# Patient Record
Sex: FEMALE | Race: WHITE | NOT HISPANIC OR LATINO | Employment: OTHER | URBAN - METROPOLITAN AREA
[De-identification: names, ages, dates, MRNs, and addresses within clinical notes are randomized per-mention and may not be internally consistent; named-entity substitution may affect disease eponyms.]

---

## 2018-01-30 ENCOUNTER — CONVERSION ENCOUNTER (OUTPATIENT)
Dept: GENERAL RADIOLOGY | Facility: HOSPITAL | Age: 73
End: 2018-01-30

## 2018-04-04 ENCOUNTER — OFFICE VISIT CONVERTED (OUTPATIENT)
Dept: FAMILY MEDICINE CLINIC | Facility: CLINIC | Age: 73
End: 2018-04-04
Attending: NURSE PRACTITIONER

## 2018-05-16 ENCOUNTER — OFFICE VISIT CONVERTED (OUTPATIENT)
Dept: FAMILY MEDICINE CLINIC | Facility: CLINIC | Age: 73
End: 2018-05-16
Attending: NURSE PRACTITIONER

## 2018-06-07 ENCOUNTER — OFFICE VISIT CONVERTED (OUTPATIENT)
Dept: FAMILY MEDICINE CLINIC | Facility: CLINIC | Age: 73
End: 2018-06-07
Attending: NURSE PRACTITIONER

## 2018-06-07 ENCOUNTER — CONVERSION ENCOUNTER (OUTPATIENT)
Dept: FAMILY MEDICINE CLINIC | Facility: CLINIC | Age: 73
End: 2018-06-07

## 2018-09-07 ENCOUNTER — OFFICE VISIT CONVERTED (OUTPATIENT)
Dept: FAMILY MEDICINE CLINIC | Facility: CLINIC | Age: 73
End: 2018-09-07
Attending: NURSE PRACTITIONER

## 2018-12-05 ENCOUNTER — CONVERSION ENCOUNTER (OUTPATIENT)
Dept: OTHER | Facility: HOSPITAL | Age: 73
End: 2018-12-05

## 2018-12-05 ENCOUNTER — OFFICE VISIT CONVERTED (OUTPATIENT)
Dept: CARDIOLOGY | Facility: CLINIC | Age: 73
End: 2018-12-05
Attending: INTERNAL MEDICINE

## 2018-12-07 ENCOUNTER — OFFICE VISIT CONVERTED (OUTPATIENT)
Dept: FAMILY MEDICINE CLINIC | Facility: CLINIC | Age: 73
End: 2018-12-07
Attending: NURSE PRACTITIONER

## 2019-02-13 ENCOUNTER — HOSPITAL ENCOUNTER (OUTPATIENT)
Dept: FAMILY MEDICINE CLINIC | Facility: CLINIC | Age: 74
Discharge: HOME | End: 2019-02-13
Attending: NURSE PRACTITIONER

## 2019-02-13 ENCOUNTER — OFFICE VISIT CONVERTED (OUTPATIENT)
Dept: FAMILY MEDICINE CLINIC | Facility: CLINIC | Age: 74
End: 2019-02-13
Attending: NURSE PRACTITIONER

## 2019-02-15 LAB
AMOXICILLIN+CLAV SUSC ISLT: 4
AMPICILLIN SUSC ISLT: 4
AMPICILLIN+SULBAC SUSC ISLT: <=2
BACTERIA UR CULT: ABNORMAL
CEFAZOLIN SUSC ISLT: <=4
CEFEPIME SUSC ISLT: <=1
CEFTAZIDIME SUSC ISLT: <=1
CEFTRIAXONE SUSC ISLT: <=1
CEFUROXIME ORAL SUSC ISLT: 4
CEFUROXIME PARENTER SUSC ISLT: 4
CIPROFLOXACIN SUSC ISLT: 1
ERTAPENEM SUSC ISLT: <=0.5
GENTAMICIN SUSC ISLT: <=1
LEVOFLOXACIN SUSC ISLT: 1
NITROFURANTOIN SUSC ISLT: <=16
TETRACYCLINE SUSC ISLT: <=1
TMP SMX SUSC ISLT: >=320
TOBRAMYCIN SUSC ISLT: <=1

## 2019-02-22 ENCOUNTER — HOSPITAL ENCOUNTER (OUTPATIENT)
Dept: OTHER | Facility: HOSPITAL | Age: 74
Discharge: HOME | End: 2019-02-22
Attending: NURSE PRACTITIONER

## 2019-02-22 LAB
APPEARANCE UR: ABNORMAL
BILIRUB UR QL: NEGATIVE
COLOR UR: ABNORMAL
CONV COLLECTION SOURCE (UA): ABNORMAL
CONV CRYSTALS: ABNORMAL /[HPF]
CONV UROBILINOGEN IN URINE BY AUTOMATED TEST STRIP: 0.2 {EHRLICHU}/DL (ref 0.1–1)
GLUCOSE UR QL: NEGATIVE MG/DL
HGB UR QL STRIP: NEGATIVE
KETONES UR QL STRIP: ABNORMAL MG/DL
LEUKOCYTE ESTERASE UR QL STRIP: NEGATIVE
MUCOUS THREADS URNS QL MICRO: ABNORMAL
NITRITE UR QL STRIP: NEGATIVE
PH UR STRIP.AUTO: 5.5 [PH] (ref 5–8)
PROT UR QL: NEGATIVE MG/DL
RBC #/AREA URNS HPF: ABNORMAL /[HPF]
SP GR UR: >=1.03 (ref 1–1.03)
SQUAMOUS SPT QL MICRO: ABNORMAL /[HPF]
WBC #/AREA URNS HPF: ABNORMAL /[HPF]

## 2019-03-12 ENCOUNTER — HOSPITAL ENCOUNTER (OUTPATIENT)
Dept: GENERAL RADIOLOGY | Facility: HOSPITAL | Age: 74
Discharge: HOME | End: 2019-03-12
Attending: NURSE PRACTITIONER

## 2019-05-31 ENCOUNTER — HOSPITAL ENCOUNTER (OUTPATIENT)
Dept: OTHER | Facility: HOSPITAL | Age: 74
Discharge: HOME | End: 2019-05-31
Attending: NURSE PRACTITIONER

## 2019-05-31 LAB
25(OH)D3 SERPL-MCNC: 42.5 NG/ML (ref 30–100)
ALBUMIN SERPL-MCNC: 4.3 G/DL (ref 3.5–5)
ALBUMIN/GLOB SERPL: 1.3 {RATIO} (ref 1.4–2.6)
ALP SERPL-CCNC: 65 U/L (ref 43–160)
ALT SERPL-CCNC: 19 U/L (ref 10–40)
ANION GAP SERPL CALC-SCNC: 17 MMOL/L (ref 8–19)
AST SERPL-CCNC: 23 U/L (ref 15–50)
BASOPHILS # BLD AUTO: 0.08 10*3/UL (ref 0–0.2)
BASOPHILS NFR BLD AUTO: 0.8 % (ref 0–3)
BILIRUB SERPL-MCNC: 0.68 MG/DL (ref 0.2–1.3)
BUN SERPL-MCNC: 11 MG/DL (ref 5–25)
BUN/CREAT SERPL: 12 {RATIO} (ref 6–20)
CALCIUM SERPL-MCNC: 9.5 MG/DL (ref 8.7–10.4)
CHLORIDE SERPL-SCNC: 102 MMOL/L (ref 99–111)
CHOLEST SERPL-MCNC: 134 MG/DL (ref 107–200)
CHOLEST/HDLC SERPL: 2.7 {RATIO} (ref 3–6)
CONV ABS IMM GRAN: 0.03 10*3/UL (ref 0–0.2)
CONV CO2: 23 MMOL/L (ref 22–32)
CONV IMMATURE GRAN: 0.3 % (ref 0–1.8)
CONV TOTAL PROTEIN: 7.5 G/DL (ref 6.3–8.2)
CREAT UR-MCNC: 0.91 MG/DL (ref 0.5–0.9)
DEPRECATED RDW RBC AUTO: 48.8 FL (ref 36.4–46.3)
EOSINOPHIL # BLD AUTO: 0.24 10*3/UL (ref 0–0.7)
EOSINOPHIL # BLD AUTO: 2.5 % (ref 0–7)
ERYTHROCYTE [DISTWIDTH] IN BLOOD BY AUTOMATED COUNT: 14 % (ref 11.7–14.4)
EST. AVERAGE GLUCOSE BLD GHB EST-MCNC: 131 MG/DL
GFR SERPLBLD BASED ON 1.73 SQ M-ARVRAT: >60 ML/MIN/{1.73_M2}
GLOBULIN UR ELPH-MCNC: 3.2 G/DL (ref 2–3.5)
GLUCOSE SERPL-MCNC: 113 MG/DL (ref 65–99)
HBA1C MFR BLD: 13.9 G/DL (ref 12–16)
HBA1C MFR BLD: 6.2 % (ref 3.5–5.7)
HCT VFR BLD AUTO: 42.2 % (ref 37–47)
HDLC SERPL-MCNC: 50 MG/DL (ref 40–60)
LDLC SERPL CALC-MCNC: 53 MG/DL (ref 70–100)
LYMPHOCYTES # BLD AUTO: 3.74 10*3/UL (ref 1–5)
MCH RBC QN AUTO: 31 PG (ref 27–31)
MCHC RBC AUTO-ENTMCNC: 32.9 G/DL (ref 33–37)
MCV RBC AUTO: 94.2 FL (ref 81–99)
MONOCYTES # BLD AUTO: 0.74 10*3/UL (ref 0.2–1.2)
MONOCYTES NFR BLD AUTO: 7.7 % (ref 3–10)
NEUTROPHILS # BLD AUTO: 4.81 10*3/UL (ref 2–8)
NEUTROPHILS NFR BLD AUTO: 49.9 % (ref 30–85)
NRBC CBCN: 0 % (ref 0–0.7)
OSMOLALITY SERPL CALC.SUM OF ELEC: 286 MOSM/KG (ref 273–304)
PLATELET # BLD AUTO: 248 10*3/UL (ref 130–400)
PMV BLD AUTO: 10.8 FL (ref 9.4–12.3)
POTASSIUM SERPL-SCNC: 4.3 MMOL/L (ref 3.5–5.3)
RBC # BLD AUTO: 4.48 10*6/UL (ref 4.2–5.4)
SODIUM SERPL-SCNC: 138 MMOL/L (ref 135–147)
T4 FREE SERPL-MCNC: 1.5 NG/DL (ref 0.9–1.8)
TRIGL SERPL-MCNC: 155 MG/DL (ref 40–150)
TSH SERPL-ACNC: 2 M[IU]/L (ref 0.27–4.2)
VARIANT LYMPHS NFR BLD MANUAL: 38.8 % (ref 20–45)
VLDLC SERPL-MCNC: 31 MG/DL (ref 5–37)
WBC # BLD AUTO: 9.64 10*3/UL (ref 4.8–10.8)

## 2019-06-07 ENCOUNTER — OFFICE VISIT CONVERTED (OUTPATIENT)
Dept: FAMILY MEDICINE CLINIC | Facility: CLINIC | Age: 74
End: 2019-06-07
Attending: NURSE PRACTITIONER

## 2019-06-26 ENCOUNTER — OFFICE VISIT CONVERTED (OUTPATIENT)
Dept: CARDIOLOGY | Facility: CLINIC | Age: 74
End: 2019-06-26
Attending: INTERNAL MEDICINE

## 2019-11-19 ENCOUNTER — HOSPITAL ENCOUNTER (OUTPATIENT)
Dept: OTHER | Facility: HOSPITAL | Age: 74
Discharge: HOME | End: 2019-11-19
Attending: NURSE PRACTITIONER

## 2019-11-19 LAB
25(OH)D3 SERPL-MCNC: 49.4 NG/ML (ref 30–100)
ALBUMIN SERPL-MCNC: 4.3 G/DL (ref 3.5–5)
ALBUMIN/GLOB SERPL: 1.4 {RATIO} (ref 1.4–2.6)
ALP SERPL-CCNC: 71 U/L (ref 43–160)
ALT SERPL-CCNC: 19 U/L (ref 10–40)
ANION GAP SERPL CALC-SCNC: 19 MMOL/L (ref 8–19)
APPEARANCE UR: CLEAR
AST SERPL-CCNC: 23 U/L (ref 15–50)
BASOPHILS # BLD AUTO: 0.06 10*3/UL (ref 0–0.2)
BASOPHILS NFR BLD AUTO: 0.7 % (ref 0–3)
BILIRUB SERPL-MCNC: 0.8 MG/DL (ref 0.2–1.3)
BILIRUB UR QL: NEGATIVE
BUN SERPL-MCNC: 12 MG/DL (ref 5–25)
BUN/CREAT SERPL: 13 {RATIO} (ref 6–20)
CALCIUM SERPL-MCNC: 9.8 MG/DL (ref 8.7–10.4)
CHLORIDE SERPL-SCNC: 100 MMOL/L (ref 99–111)
CHOLEST SERPL-MCNC: 138 MG/DL (ref 107–200)
CHOLEST/HDLC SERPL: 3.1 {RATIO} (ref 3–6)
COLOR UR: YELLOW
CONV ABS IMM GRAN: 0.02 10*3/UL (ref 0–0.2)
CONV BACTERIA: ABNORMAL
CONV CO2: 24 MMOL/L (ref 22–32)
CONV COLLECTION SOURCE (UA): ABNORMAL
CONV CREATININE URINE, RANDOM: 96.8 MG/DL (ref 10–300)
CONV IMMATURE GRAN: 0.2 % (ref 0–1.8)
CONV MICROALBUM.,U,RANDOM: <12 MG/L (ref 0–20)
CONV TOTAL PROTEIN: 7.4 G/DL (ref 6.3–8.2)
CONV UROBILINOGEN IN URINE BY AUTOMATED TEST STRIP: 0.2 {EHRLICHU}/DL (ref 0.1–1)
CREAT UR-MCNC: 0.89 MG/DL (ref 0.5–0.9)
DEPRECATED RDW RBC AUTO: 46.6 FL (ref 36.4–46.3)
EOSINOPHIL # BLD AUTO: 0.19 10*3/UL (ref 0–0.7)
EOSINOPHIL # BLD AUTO: 2.3 % (ref 0–7)
ERYTHROCYTE [DISTWIDTH] IN BLOOD BY AUTOMATED COUNT: 13.1 % (ref 11.7–14.4)
EST. AVERAGE GLUCOSE BLD GHB EST-MCNC: 140 MG/DL
GFR SERPLBLD BASED ON 1.73 SQ M-ARVRAT: >60 ML/MIN/{1.73_M2}
GLOBULIN UR ELPH-MCNC: 3.1 G/DL (ref 2–3.5)
GLUCOSE SERPL-MCNC: 117 MG/DL (ref 65–99)
GLUCOSE UR QL: NEGATIVE MG/DL
HBA1C MFR BLD: 6.5 % (ref 3.5–5.7)
HCT VFR BLD AUTO: 43 % (ref 37–47)
HDLC SERPL-MCNC: 44 MG/DL (ref 40–60)
HGB BLD-MCNC: 14.1 G/DL (ref 12–16)
HGB UR QL STRIP: NEGATIVE
KETONES UR QL STRIP: NEGATIVE MG/DL
LDLC SERPL CALC-MCNC: 59 MG/DL (ref 70–100)
LEUKOCYTE ESTERASE UR QL STRIP: ABNORMAL
LYMPHOCYTES # BLD AUTO: 3.47 10*3/UL (ref 1–5)
LYMPHOCYTES NFR BLD AUTO: 41.3 % (ref 20–45)
MCH RBC QN AUTO: 31.6 PG (ref 27–31)
MCHC RBC AUTO-ENTMCNC: 32.8 G/DL (ref 33–37)
MCV RBC AUTO: 96.4 FL (ref 81–99)
MICROALBUMIN/CREAT UR: 12.4 MG/G{CRE} (ref 0–35)
MONOCYTES # BLD AUTO: 0.68 10*3/UL (ref 0.2–1.2)
MONOCYTES NFR BLD AUTO: 8.1 % (ref 3–10)
NEUTROPHILS # BLD AUTO: 3.99 10*3/UL (ref 2–8)
NEUTROPHILS NFR BLD AUTO: 47.4 % (ref 30–85)
NITRITE UR QL STRIP: POSITIVE
NRBC CBCN: 0 % (ref 0–0.7)
OSMOLALITY SERPL CALC.SUM OF ELEC: 289 MOSM/KG (ref 273–304)
PH UR STRIP.AUTO: 5 [PH] (ref 5–8)
PLATELET # BLD AUTO: 239 10*3/UL (ref 130–400)
PMV BLD AUTO: 10.7 FL (ref 9.4–12.3)
POTASSIUM SERPL-SCNC: 4 MMOL/L (ref 3.5–5.3)
PROT UR QL: NEGATIVE MG/DL
RBC # BLD AUTO: 4.46 10*6/UL (ref 4.2–5.4)
RBC #/AREA URNS HPF: ABNORMAL /[HPF]
SODIUM SERPL-SCNC: 139 MMOL/L (ref 135–147)
SP GR UR: 1.01 (ref 1–1.03)
T4 FREE SERPL-MCNC: 1.5 NG/DL (ref 0.9–1.8)
TRIGL SERPL-MCNC: 173 MG/DL (ref 40–150)
TSH SERPL-ACNC: 1.62 M[IU]/L (ref 0.27–4.2)
VLDLC SERPL-MCNC: 35 MG/DL (ref 5–37)
WBC # BLD AUTO: 8.41 10*3/UL (ref 4.8–10.8)
WBC #/AREA URNS HPF: ABNORMAL /[HPF]

## 2019-11-21 LAB
AMOXICILLIN+CLAV SUSC ISLT: <=2
AMPICILLIN SUSC ISLT: <=2
AMPICILLIN+SULBAC SUSC ISLT: <=2
BACTERIA UR CULT: ABNORMAL
CEFAZOLIN SUSC ISLT: <=4
CEFEPIME SUSC ISLT: <=1
CEFTAZIDIME SUSC ISLT: <=1
CEFTRIAXONE SUSC ISLT: <=1
CEFUROXIME ORAL SUSC ISLT: 4
CEFUROXIME PARENTER SUSC ISLT: 4
CIPROFLOXACIN SUSC ISLT: 1
ERTAPENEM SUSC ISLT: <=0.5
GENTAMICIN SUSC ISLT: <=1
LEVOFLOXACIN SUSC ISLT: 1
NITROFURANTOIN SUSC ISLT: <=16
TETRACYCLINE SUSC ISLT: <=1
TMP SMX SUSC ISLT: <=20
TOBRAMYCIN SUSC ISLT: <=1

## 2019-12-09 ENCOUNTER — OFFICE VISIT CONVERTED (OUTPATIENT)
Dept: FAMILY MEDICINE CLINIC | Facility: CLINIC | Age: 74
End: 2019-12-09
Attending: NURSE PRACTITIONER

## 2020-01-16 ENCOUNTER — OFFICE VISIT CONVERTED (OUTPATIENT)
Dept: FAMILY MEDICINE CLINIC | Facility: CLINIC | Age: 75
End: 2020-01-16
Attending: NURSE PRACTITIONER

## 2020-06-12 ENCOUNTER — HOSPITAL ENCOUNTER (OUTPATIENT)
Dept: FAMILY MEDICINE CLINIC | Facility: CLINIC | Age: 75
Discharge: HOME | End: 2020-06-12
Attending: NURSE PRACTITIONER

## 2020-06-12 ENCOUNTER — OFFICE VISIT CONVERTED (OUTPATIENT)
Dept: FAMILY MEDICINE CLINIC | Facility: CLINIC | Age: 75
End: 2020-06-12
Attending: NURSE PRACTITIONER

## 2020-06-12 LAB
25(OH)D3 SERPL-MCNC: 44.2 NG/ML (ref 30–100)
ALBUMIN SERPL-MCNC: 4.2 G/DL (ref 3.5–5)
ALBUMIN/GLOB SERPL: 1.5 {RATIO} (ref 1.4–2.6)
ALP SERPL-CCNC: 73 U/L (ref 43–160)
ALT SERPL-CCNC: 22 U/L (ref 10–40)
ANION GAP SERPL CALC-SCNC: 14 MMOL/L (ref 8–19)
APPEARANCE UR: CLEAR
AST SERPL-CCNC: 26 U/L (ref 15–50)
BILIRUB SERPL-MCNC: 0.67 MG/DL (ref 0.2–1.3)
BILIRUB UR QL: NEGATIVE
BUN SERPL-MCNC: 12 MG/DL (ref 5–25)
BUN/CREAT SERPL: 16 {RATIO} (ref 6–20)
CALCIUM SERPL-MCNC: 9.6 MG/DL (ref 8.7–10.4)
CHLORIDE SERPL-SCNC: 103 MMOL/L (ref 99–111)
CHOLEST SERPL-MCNC: 128 MG/DL (ref 107–200)
CHOLEST/HDLC SERPL: 3 {RATIO} (ref 3–6)
COLOR UR: YELLOW
CONV BACTERIA: ABNORMAL
CONV CO2: 25 MMOL/L (ref 22–32)
CONV COLLECTION SOURCE (UA): ABNORMAL
CONV CREATININE URINE, RANDOM: 162.6 MG/DL (ref 10–300)
CONV CRYSTALS: ABNORMAL /[HPF]
CONV MICROALBUM.,U,RANDOM: 13 MG/L (ref 0–20)
CONV TOTAL PROTEIN: 7 G/DL (ref 6.3–8.2)
CONV UROBILINOGEN IN URINE BY AUTOMATED TEST STRIP: 0.2 {EHRLICHU}/DL (ref 0.1–1)
CREAT UR-MCNC: 0.77 MG/DL (ref 0.5–0.9)
EST. AVERAGE GLUCOSE BLD GHB EST-MCNC: 157 MG/DL
GFR SERPLBLD BASED ON 1.73 SQ M-ARVRAT: >60 ML/MIN/{1.73_M2}
GLOBULIN UR ELPH-MCNC: 2.8 G/DL (ref 2–3.5)
GLUCOSE SERPL-MCNC: 91 MG/DL (ref 65–99)
GLUCOSE UR QL: NEGATIVE MG/DL
HBA1C MFR BLD: 7.1 % (ref 3.5–5.7)
HDLC SERPL-MCNC: 42 MG/DL (ref 40–60)
HGB UR QL STRIP: NEGATIVE
KETONES UR QL STRIP: NEGATIVE MG/DL
LDLC SERPL CALC-MCNC: 49 MG/DL (ref 70–100)
LEUKOCYTE ESTERASE UR QL STRIP: ABNORMAL
MICROALBUMIN/CREAT UR: 8 MG/G{CRE} (ref 0–35)
MUCOUS THREADS URNS QL MICRO: ABNORMAL
NITRITE UR QL STRIP: POSITIVE
OSMOLALITY SERPL CALC.SUM OF ELEC: 285 MOSM/KG (ref 273–304)
PH UR STRIP.AUTO: 5.5 [PH] (ref 5–8)
POTASSIUM SERPL-SCNC: 4.1 MMOL/L (ref 3.5–5.3)
PROT UR QL: NEGATIVE MG/DL
RBC #/AREA URNS HPF: ABNORMAL /[HPF]
SODIUM SERPL-SCNC: 138 MMOL/L (ref 135–147)
SP GR UR: 1.02 (ref 1–1.03)
SQUAMOUS SPT QL MICRO: ABNORMAL /[HPF]
T4 FREE SERPL-MCNC: 1.8 NG/DL (ref 0.9–1.8)
TRIGL SERPL-MCNC: 185 MG/DL (ref 40–150)
TSH SERPL-ACNC: 1.35 M[IU]/L (ref 0.27–4.2)
VLDLC SERPL-MCNC: 37 MG/DL (ref 5–37)
WBC #/AREA URNS HPF: ABNORMAL /[HPF]

## 2020-10-09 ENCOUNTER — HOSPITAL ENCOUNTER (OUTPATIENT)
Dept: FAMILY MEDICINE CLINIC | Facility: CLINIC | Age: 75
Discharge: HOME | End: 2020-10-09
Attending: NURSE PRACTITIONER

## 2020-10-09 ENCOUNTER — CONVERSION ENCOUNTER (OUTPATIENT)
Dept: FAMILY MEDICINE CLINIC | Facility: CLINIC | Age: 75
End: 2020-10-09

## 2020-10-09 ENCOUNTER — OFFICE VISIT CONVERTED (OUTPATIENT)
Dept: FAMILY MEDICINE CLINIC | Facility: CLINIC | Age: 75
End: 2020-10-09
Attending: NURSE PRACTITIONER

## 2020-10-11 LAB
BACTERIA UR CULT: ABNORMAL
CEFAZOLIN SUSC ISLT: <=4
CEFEPIME SUSC ISLT: <=0.12
CEFTAZIDIME SUSC ISLT: <=1
CEFTRIAXONE SUSC ISLT: <=0.25
CIPROFLOXACIN SUSC ISLT: <=0.25
ERTAPENEM SUSC ISLT: <=0.12
GENTAMICIN SUSC ISLT: <=1
LEVOFLOXACIN SUSC ISLT: <=0.12
NITROFURANTOIN SUSC ISLT: 32
PIP+TAZO SUSC ISLT: <=4
TMP SMX SUSC ISLT: <=20
TOBRAMYCIN SUSC ISLT: <=1

## 2020-12-14 ENCOUNTER — HOSPITAL ENCOUNTER (OUTPATIENT)
Dept: FAMILY MEDICINE CLINIC | Facility: CLINIC | Age: 75
Discharge: HOME | End: 2020-12-14
Attending: NURSE PRACTITIONER

## 2020-12-14 ENCOUNTER — OFFICE VISIT CONVERTED (OUTPATIENT)
Dept: FAMILY MEDICINE CLINIC | Facility: CLINIC | Age: 75
End: 2020-12-14
Attending: NURSE PRACTITIONER

## 2020-12-14 LAB
25(OH)D3 SERPL-MCNC: 46.8 NG/ML (ref 30–100)
ALBUMIN SERPL-MCNC: 4.2 G/DL (ref 3.5–5)
ALBUMIN/GLOB SERPL: 1.4 {RATIO} (ref 1.4–2.6)
ALP SERPL-CCNC: 89 U/L (ref 43–160)
ALT SERPL-CCNC: 28 U/L (ref 10–40)
ANION GAP SERPL CALC-SCNC: 16 MMOL/L (ref 8–19)
AST SERPL-CCNC: 36 U/L (ref 15–50)
BILIRUB SERPL-MCNC: 0.53 MG/DL (ref 0.2–1.3)
BUN SERPL-MCNC: 12 MG/DL (ref 5–25)
BUN/CREAT SERPL: 14 {RATIO} (ref 6–20)
CALCIUM SERPL-MCNC: 9.7 MG/DL (ref 8.7–10.4)
CHLORIDE SERPL-SCNC: 102 MMOL/L (ref 99–111)
CHOLEST SERPL-MCNC: 134 MG/DL (ref 107–200)
CHOLEST/HDLC SERPL: 3.1 {RATIO} (ref 3–6)
CONV CO2: 25 MMOL/L (ref 22–32)
CONV TOTAL PROTEIN: 7.2 G/DL (ref 6.3–8.2)
CREAT UR-MCNC: 0.84 MG/DL (ref 0.5–0.9)
EST. AVERAGE GLUCOSE BLD GHB EST-MCNC: 160 MG/DL
GFR SERPLBLD BASED ON 1.73 SQ M-ARVRAT: >60 ML/MIN/{1.73_M2}
GLOBULIN UR ELPH-MCNC: 3 G/DL (ref 2–3.5)
GLUCOSE SERPL-MCNC: 145 MG/DL (ref 65–99)
HBA1C MFR BLD: 7.2 % (ref 3.5–5.7)
HDLC SERPL-MCNC: 43 MG/DL (ref 40–60)
LDLC SERPL CALC-MCNC: 68 MG/DL (ref 70–100)
OSMOLALITY SERPL CALC.SUM OF ELEC: 290 MOSM/KG (ref 273–304)
POTASSIUM SERPL-SCNC: 4.1 MMOL/L (ref 3.5–5.3)
SODIUM SERPL-SCNC: 139 MMOL/L (ref 135–147)
T4 FREE SERPL-MCNC: 1.9 NG/DL (ref 0.9–1.8)
TRIGL SERPL-MCNC: 113 MG/DL (ref 40–150)
TSH SERPL-ACNC: 1.1 M[IU]/L (ref 0.27–4.2)
VLDLC SERPL-MCNC: 23 MG/DL (ref 5–37)

## 2021-02-24 ENCOUNTER — HOSPITAL ENCOUNTER (OUTPATIENT)
Dept: VACCINE CLINIC | Facility: HOSPITAL | Age: 76
Discharge: HOME | End: 2021-02-24
Attending: INTERNAL MEDICINE

## 2021-03-25 ENCOUNTER — HOSPITAL ENCOUNTER (OUTPATIENT)
Dept: VACCINE CLINIC | Facility: HOSPITAL | Age: 76
Discharge: HOME | End: 2021-03-25
Attending: INTERNAL MEDICINE

## 2021-05-13 NOTE — PROGRESS NOTES
Progress Note      Patient Name: Elizabeth Dwyer   Patient ID: [id number]   Sex: Female   YOB: 1945    Primary Care Provider: Sheyla DOBSON   Referring Provider: Sheyla DOBSON    Visit Date: June 12, 2020    Provider: BRONSON Ruffin   Location: [facility]   Location Address: [address] [place] [address]  [id number]   Location Phone: [phone]          Chief Complaint  · Annual Wellness Exam      History Of Present Illness  Elizabeth Dwyer is a 75 year old /White female who presents for evaluation and treatment of:   The patient is a 75 year old /White female who has come to this office for her Annual Wellness Visit.   Her Primary Care Provider is Sheyla DOBSON. Her comprehensive Care Team list, including suppliers, has been updated on the Facesheet. Her medical/family history, height, weight, BMI, and blood pressure have been reviewed and are in the chart. The Health Risk Assessment has been completed and scanned in the chart.   Medications are listed in the medication list.   The active problem list includes: Allergic rhinitis, Diabetes mellitus, type II, Gastroesophageal Reflux Disorder, Hyperlipidemia, Hypertension, Hypothyroidism, and VitaminD Deficiency   The patient does not have a history of substance use.   Patient reports her diet is adequate.   The Mini-Cog has been administered and is scanned in chart. The results are negative. Her cognitive function is without limitation.   A hearing loss screen was completed today and the result is negative.   Patient does not have any risk factors for depression. Patient completed the PHQ-9 today and it has been scanned in the chart. The total score is 1-4.   The Timed Up and Go screen was administered today and the result is negative.   The Weeks Index of [place] in ADLs indicated full function (score of 6).   A Falls Risk Assessment has been completed, including a review of home  fall hazards and medication review.   Overall, the patient's functional ability is noted by this provider to be within normal limits. Her level of safety is noted to be within normal limits. Her balance/gait is within normal limits. There have been no falls in the past year. Patient-specific home safety recommendations have been reviewed and a copy has been given to patient.   She denies issues with leaking urine.   There are no additional risk factors identified.   Living Will/Advanced Directive has not previously been completed.   Personalized health advice was given to the patient and a written health screening schedule was established; see Plan for details.       Past Medical History  Disease Name Date Onset Notes   *Other 10/22/08 Diverticulosis W/O mention of hemorrhage (562.10)   Allergic rhinitis --  --    Bone Density Screening 1/30/18 --    Colon Polyps --  --    Cystocele/Rectocele 04/04/2008 --    Diabetes Mellitus, Type II --  --    Diverticulosis --  --    Gastroesophageal Reflux Disorder --  --    Hematuria 11/13/2012 Microscopic   Hyperlipidemia --  --    Hypertension --  --    Hypothyroidism --  --    Osteoporosis 08/17/2004 --    Screening Mammogram 3/12/19 --    Stress incontinence, female 04/04/2008 --    VitaminD Deficiency --  --          Past Surgical History  Procedure Name Date Notes   Breast biopsy, right breast 1979 Benign   Cholecstectomy 1996 --    Colonoscopy 2018 Cologuard neg 09/2018   Partial Hysterectomy 1991 --    Polypectomy 10/08 --          Medication List  Name Date Started Instructions   Aspir-81 81 mg oral tablet,delayed release (/EC) 06/12/2020 take 1 tablet (81 mg) by oral route once daily   Blood Glucose Test miscellaneous strip 06/12/2020 use as directed to test blood sugar daily for Dx: E11.9   Bydureon BCise 2 mg/0.85 mL subcutaneous auto-injector 06/12/2020 inject 2 mg by subcutaneous route every 7 days in the abdomen, thighs, or outer area of upper arm rotating  injection sites for 90 days   fexofenadine 180 mg oral tablet 06/12/2020 take 1 tablet (180 mg) by oral route once daily for 90 days   fluticasone propionate 50 mcg/actuation nasal spray,suspension 06/12/2020 spray 2 sprays (100 mcg) in each nostril by intranasal route once daily as needed for 30 days   Janumet XR 50-1,000 mg oral tablet, ER multiphase 24 hr 06/12/2020 take 1 tablet by oral route 2 times a day for 90 days   lancets Miscellaneous Misc 12/06/2017 use as directed   lisinopril 5 mg oral tablet 06/12/2020 take 1 tablet (5 mg) by oral route once daily for 90 days   nitroglycerin 0.2 mg/hr transdermal patch 24 hour 06/12/2020 apply 1 patch by transdermal route once daily remove at night for 10-12 hours for 90 days   Pepcid 20 mg oral tablet 06/12/2020 take 1 tablet by oral route 2 times a day as needed for 90 days   Pravachol 20 mg oral tablet 06/12/2020 take 1 tablet by oral route once a day (at bedtime) for 90 days   Protonix 40 mg oral tablet,delayed release (DR/EC) 06/12/2020 take 1 tablet (40 mg) by oral route once daily for 90 days   Synthroid 75 mcg oral tablet 06/12/2020 take 1 tablet (75 mcg) by oral route once daily for 90 days   Toprol XL 25 mg oral tablet extended release 24 hr 06/12/2020 take 1 tablet (25 mg) by oral route once daily for 90 days   vitamin B complex oral capsule  take 1 capsule by oral route daily   Vitamin D3 50 mcg (2,000 unit) oral tablet 06/12/2020 take 1 tablet by oral route daily for 90 days         Allergy List  Allergen Name Date Reaction Notes   NO KNOWN DRUG ALLERGIES --  --  --          Family Medical History  Disease Name Relative/Age Notes   Breast Neoplasm Sister/   --    Hypertension Father/   --    Lung cancer Sister/   --    Diabetes Mellitus, Type II Grandmother (maternal)/  Mother/   --          Reproductive History  Menstrual   Menopause Status: Postmenopausal HRT?: No   Pregnancy Summary   Total Pregnancies: 2 Full Term: 2 Premature: 0   Ab Induced: 0 Ab  "Spontaneous: 0 Ectopics: 0   Multiples: 0 Livin         Social History  Finding Status Start/Stop Quantity Notes   Alcohol Never --/-- --  --    Tobacco Former 35/53 1PPD X 15 years quit smoking approx          Immunizations  NameDate Admin Mfg Trade Name Lot Number Route Inj VIS Given VIS Publication   Iljlafvri13/2019 PMC Fluzone Quadrivalent [id number] IM  10/11/2019    Comments: Pt tolerated   Clguviais56/26/2018 PMC Fluzone Quadrivalent [id number] IM  2018   Comments: Pt tolerated   Iaylmxufx78/06/2017 PMC Fluzone Quadrivalent [id number] IM  2017   Comments: Pt tolerated   Kfswgngih01/15/2016 PMC FLUZONE-HIGH DOSE [id number] IM  11/15/2016 2014   Comments: Pt tolerated well. Left office in stable condition.   Caggxecbn76/ SKB Fluarix, quadrivalent, preservative free [id number] IM LD 10/19/2015 2012   Comments: Tolerated well and left office in stable condition.   Plnsqegbr66Uvkdzdv 2018 NE Not Entered  NE NE     Comments: Pt refused   Prevnar 13Refused 2018 NE Not Entered  NE NE     Comments: Pt refused         Vitals  Date Time BP Position Site L\R Cuff Size HR RR TEMP (F) WT  HT  BMI kg/m2 BSA m2 O2 Sat        2020 10:34 /52 Sitting    81 - R 12 97.6 161lbs 0oz 5'  7\" 25.22 1.86 96 %              Assessment  · Screening for alcoholism     .  · Screening for depression       · Encounter for Medicare annual wellness exam     0.0/Z00.      Plan  · Orders  o Annual alcohol screening using the AUDIT-C tool, 15 minutes [facility] ([address]12, ) - 9./Z13. - 2020  o Negative alcohol screening () - 9.Z13. - 2020  o Falls Risk Assessment Completed (3280F) - 0.0/Z00.00 - 2020  o Brief hearing screening (written) [facility] () - V70.0/Z00.00 - 2020  o Presence or absence of urinary incontinence assessed (KANDI) (1090F) - V70.0/Z00.00 - 2020  o ACO-39: Current medications updated and " reviewed () - - 06/12/2020  o ACO-18: Negative screen for clinical depression using a standardized tool () - - 06/12/2020  o ACO-14: Influenza immunization administered or previously received () - - 06/12/2020  o ACO-20: Screening Mammography documented and reviewed () - - 06/12/2020  o ACO-19: Colorectal cancer screening results documented and reviewed (3017F) - - 06/12/2020  o ACO-13: Fall Risk Screening with no falls in past year or only one fall without injury in the past year (1101F) - - 06/12/2020  o ACO - Pt declines to or was not able to provide an Advance Care Plan or name a Surrogate Decision Maker (1124F) - - 06/12/2020  · Medications  o Medications have been Reconciled  o Transition of Care or Provider Policy  · Instructions  o Audit-C Questionnaire completed and scanned into the EMR under the designated folder within the patient's documents.  o Audit-C score of 0-4 - Negative Screen - Brief Discussion  o Depression Screen completed and scanned into the EMR under the designated folder within the patient's documents.  o Today's PHQ-9 result is _2__  o Health Risk Assessment has been reviewed with the patient.  o Written health screening schedule for next 5-10 years was established with patient; information scanned in chart and given/mailed to patient.  o Fall prevention methods discussed and a copy of recommendations given/mailed to patient.  o Patient was educated/instructed on their diagnosis, treatment and medications prior to discharge from the clinic today.  o Pneumonia vaccine declined.   · Disposition  o FOLLOW UP PRN            Electronically Signed by: BRONSON Ruffin -Author on June 17, 2020 01:34:30 PM

## 2021-05-13 NOTE — PROGRESS NOTES
Progress Note      Patient Name: Elizabeth Dwyer   Patient ID: [id number]   Sex: Female   YOB: 1945    Primary Care Provider: Sheyla DOBSON   Referring Provider: Sheyla DOBSON    Visit Date: October 9, 2020    Provider: BRONSON Ruffin   Location: [facility]   Location Address: [address] [place] [address]  [id number]   Location Phone: [phone]          Chief Complaint  · Acute Visit for Possible UTI      History Of Present Illness  Elizabeth Dwyer is a 75 year old /White female who presents for evaluation and treatment of:      Acute Visit for Possible UTI  Pt c/o burning with urination, frequency and urgency. Onset 1 week ago.       Flu shot- Pt requested today       Past Medical History  Disease Name Date Onset Notes   *Other 10/22/08 Diverticulosis W/O mention of hemorrhage (562.10)   Allergic rhinitis --  --    Bone Density Screening 1/30/18 --    Colon Polyps --  --    Cystocele/Rectocele 04/04/2008 --    Diabetes Mellitus, Type II --  --    Diverticulosis --  --    Gastroesophageal Reflux Disorder --  --    Hematuria 11/13/2012 Microscopic   Hyperlipidemia --  --    Hypertension --  --    Hypothyroidism --  --    Osteoporosis 08/17/2004 --    Screening Mammogram 3/12/19 --    Stress incontinence, female 04/04/2008 --    VitaminD Deficiency --  --          Past Surgical History  Procedure Name Date Notes   Breast biopsy, right breast 1979 Benign   Cholecstectomy 1996 --    Colonoscopy 2018 Cologuard neg 09/2018   Partial Hysterectomy 1991 --    Polypectomy 10/08 --          Medication List  Name Date Started Instructions   Aspir-81 81 mg oral tablet,delayed release (DR/EC) 06/12/2020 take 1 tablet (81 mg) by oral route once daily   Blood Glucose Test miscellaneous strip 06/12/2020 use as directed to test blood sugar daily for Dx: E11.9   Bydureon BCise 2 mg/0.85 mL subcutaneous auto-injector 06/12/2020 inject 2 mg by  subcutaneous route every 7 days in the abdomen, thighs, or outer area of upper arm rotating injection sites for 90 days   Diflucan 150 mg oral tablet 10/09/2020 take 1 tablet by oral route every 3 days as needed for 9 days   fexofenadine 180 mg oral tablet 06/12/2020 take 1 tablet (180 mg) by oral route once daily for 90 days   fluticasone propionate 50 mcg/actuation nasal spray,suspension 06/12/2020 spray 2 sprays (100 mcg) in each nostril by intranasal route once daily as needed for 30 days   Janumet XR 50-1,000 mg oral tablet, ER multiphase 24 hr 06/12/2020 take 1 tablet by oral route 2 times a day for 90 days   lancets Miscellaneous Misc 12/06/2017 use as directed   lisinopril 5 mg oral tablet 06/12/2020 take 1 tablet (5 mg) by oral route once daily for 90 days   Macrobid 100 mg oral capsule 07/21/2020 take 1 capsule (100 mg) by oral route 2 times per day with food for 7 days   nitroglycerin 0.2 mg/hr transdermal patch 24 hour 06/12/2020 apply 1 patch by transdermal route once daily remove at night for 10-12 hours for 90 days   Pepcid 20 mg oral tablet 06/12/2020 take 1 tablet by oral route 2 times a day as needed for 90 days   Pravachol 20 mg oral tablet 06/12/2020 take 1 tablet by oral route once a day (at bedtime) for 90 days   Protonix 40 mg oral tablet,delayed release (DR/EC) 06/12/2020 take 1 tablet (40 mg) by oral route once daily for 90 days   Synthroid 75 mcg oral tablet 06/12/2020 take 1 tablet (75 mcg) by oral route once daily for 90 days   Toprol XL 25 mg oral tablet extended release 24 hr 06/12/2020 take 1 tablet (25 mg) by oral route once daily for 90 days   vitamin B complex oral capsule  take 1 capsule by oral route daily   Vitamin D3 50 mcg (2,000 unit) oral tablet 06/12/2020 take 1 tablet by oral route daily for 90 days         Allergy List  Allergen Name Date Reaction Notes   NO KNOWN DRUG ALLERGIES --  --  --          Family Medical History  Disease Name Relative/Age Notes   Breast Neoplasm  "Sister/   --    Hypertension Father/   --    Lung cancer Sister/   --    Diabetes Mellitus, Type II Grandmother (maternal)/  Mother/   --          Reproductive History  Menstrual   Menopause Status: Postmenopausal HRT?: No   Pregnancy Summary   Total Pregnancies: 2 Full Term: 2 Premature: 0   Ab Induced: 0 Ab Spontaneous: 0 Ectopics: 0   Multiples: 0 Livin         Social History  Finding Status Start/Stop Quantity Notes   Alcohol Never --/-- --  --    Tobacco Former 35/53 1PPD X 15 years quit smoking approx          Immunizations  NameDate Admin Mfg Trade Name Lot Number Route Inj VIS Given VIS Publication   Jpktcoayo41/2020 SKB Fluarix, quadrivalent, preservative free [id number] IM LD 10/09/2020    Comments: NDC: 61825-668-37. Pt tolerated well.   Grueevrfp96Dokoxyn 2018 NE Not Entered  NE NE     Comments: Pt refused   Prevnar 13Refused 2018 NE Not Entered  NE NE     Comments: Pt refused         Review of Systems  · Constitutional  o Denies  o : fever, fatigue, weight loss, weight gain  · Cardiovascular  o Denies  o : lower extremity edema, claudication, chest pressure, palpitations  · Respiratory  o Denies  o : shortness of breath, wheezing, frequent cough, hemoptysis, dyspnea on exertion  · Gastrointestinal  o Denies  o : nausea, vomiting, diarrhea, constipation, abdominal pain  · Genitourinary  o Admits  o : dysuria, urinary frequency, urinary urgency  o Denies  o : polyuria      Vitals  Date Time BP Position Site L\R Cuff Size HR RR TEMP (F) WT  HT  BMI kg/m2 BSA m2 O2 Sat FR L/min FiO2 HC       2020 01:43 /53 Sitting    86 - R 12 98.3 155lbs 16oz 5'  7\" 24.43 1.83 94 %      2020 10:34 /52 Sitting    81 - R 12 97.6 161lbs 0oz 5'  7\" 25.22 1.86 96 %      10/09/2020 10:10 /59 Sitting    71 - R 18 97.2 162lbs 2oz 5'  7\" 25.39 1.86 97 %            Physical Examination  · Constitutional  o Appearance  o : well-nourished, in no acute " distress  · Eyes  o Conjunctivae  o : conjunctivae normal  o Sclerae  o : sclerae white  o Pupils and Irises  o : pupils equal and round  o Eyelids/Ocular Adnexae  o : eyelid appearance normal, no exudates present  · Respiratory  o Respiratory Effort  o : breathing unlabored  o Inspection of Chest  o : normal appearance  o Auscultation of Lungs  o : normal breath sounds throughout inspiration and expiration  · Cardiovascular  o Heart  o :   § Auscultation of Heart  § : regular rate and rhythm, no murmurs, gallops or rubs  · Gastrointestinal  o Abdominal Examination  o : abdomen nontender to palpation, tone normal without rigidity or guarding, no masses present, bowel sounds present  · Musculoskeletal  o Spine  o :   § Inspection/Palpation  § : no CVA tenderness noted  § Range of Motion  § : spine range of motion normal  · Skin and Subcutaneous Tissue  o General Inspection  o : no rashes or lesions present, no areas of discoloration  o Body Hair  o : hair normal for age, general body hair distribution normal for age  o Digits and Nails  o : no clubbing, cyanosis, deformities or edema present, normal appearing nails  · Neurologic  o Mental Status Examination  o :   § Orientation  § : grossly oriented to person, place and time  o Gait and Station  o : normal gait, able to stand without difficulty  · Psychiatric  o Judgement and Insight  o : judgment and insight intact  o Mood and Affect  o : mood normal, affect appropriate          Results  · In-Office Procedures  o Lab procedure  § IOP - Urinalysis without Microscopy ([place]) [facility] ([id number])   § Color Ur: Yellow   § Clarity Ur: Clear   § Glucose Ur Ql Strip: Negative   § Bilirub Ur Ql Strip: Negative   § Ketones Ur Ql Strip: Negative   § Sp Gr Ur Qn: 1.010   § Hgb Ur Ql Strip: Negative   § pH Ur-LsCnc: 6.0   § Prot Ur Ql Strip: Negative   § Urobilinogen Ur Strip-mCnc: 0.2 E.U./dL   § Nitrite Ur Ql Strip: Negative   § WBC Est Ur Ql Strip: Trace       Assessment  · Need  for influenza vaccination     V04.81/Z23  · Dysuria     788.1/R30.0  · Urinary frequency     788.41/R35.0  · Urinary urgency     788.63/R39.15      Plan  · Orders  o Immunization Admin Fee (Single) ([facility]) (09530) - V04.81/Z23 - 10/09/2020  o Fluzone High Dose Flu Vaccine (29487) - V04.81/Z23 - 10/09/2020   Vaccine - Influenza; Dose: 0.7; Site: Left Deltoid; Route: Intramuscular; Date: 10/09/2020 10:56:00; Exp: 06/30/2021; Lot: [id number]; Mfg: Jaunt; TradeName: Fluarix, quadrivalent, preservative free; Administered By: Eliza Turner MA; Comment: NDC: 06343-320-74. Pt tolerated well.  o Urine culture (81832, 26517) - - 10/09/2020  o ACO-39: Current medications updated and reviewed (, 1159F) - - 10/09/2020  · Medications  o Cipro 500 mg oral tablet   SIG: take 1 tablet (500 mg) by oral route 2 times per day for 3 days   DISP: (6) Tablet with 0 refills  Prescribed on 10/09/2020     o Diflucan 150 mg oral tablet   SIG: take 1 tablet by oral route every 3 days as needed for 9 days   DISP: (3) Tablet with 0 refills  Refilled on 10/09/2020     o Medications have been Reconciled  o Transition of Care or Provider Policy  · Instructions  o Patient was educated/instructed on their diagnosis, treatment and medications prior to discharge from the clinic today.  o Call the office with any concerns or questions.  · Disposition  o Call or Return if symptoms worsen or persist.            Electronically Signed by: BRONSON Ruffin - on October 9, 2020 11:15:16 AM

## 2021-05-13 NOTE — PROGRESS NOTES
Progress Note      Patient Name: Elizabeth Dwyer   Patient ID: [id number]   Sex: Female   YOB: 1945    Primary Care Provider: Sheyla DOBSON   Referring Provider: Sheyla DOBSON    Visit Date: December 14, 2020    Provider: BRONSON Ruffin   Location: [facility]   Location Address: [address] [place] [address]  [id number]   Location Phone: [phone]          Chief Complaint  · 6 Month Follow up for Diabetes, HTN, Hyperlipidemia, Hypothyroidism and GERD      History Of Present Illness  Elizabeth Dwyer is a 75 year old /White female who presents for evaluation and treatment of:      6 Month Follow up for Diabetes, HTN, Hyperlipidemia, Hypothyroidism and GERD  Last lab work done 6/12/20  Med refills needed    Pt reported going to the ER last week ([facility]) for Diverticulosis. Pt has appt w/ gastro on Thursday. Pt is feeling better now. 2008 c'scope and 2018 negative cologuard.     DM - pt reports around 115-120 in the morning.     GERD - controlled with med.    HTN - well controlled.     Hyperlipidemia - denies any myalgias.       flu shot-10/2020       Past Medical History  Disease Name Date Onset Notes   *Other 10/22/08 Diverticulosis W/O mention of hemorrhage (562.10)   Allergic rhinitis --  --    Bone Density Screening 1/30/18 --    Colon Polyps --  --    Cystocele/Rectocele 04/04/2008 --    Diabetes Mellitus, Type II --  --    Diverticulosis --  --    Gastroesophageal Reflux Disorder --  --    Hematuria 11/13/2012 Microscopic   Hyperlipidemia --  --    Hypertension --  --    Hypothyroidism --  --    Osteoporosis 08/17/2004 --    Screening Mammogram 3/12/19 --    Stress incontinence, female 04/04/2008 --    VitaminD Deficiency --  --          Past Surgical History  Procedure Name Date Notes   Breast biopsy, right breast 1979 Benign   Cholecstectomy 1996 --    Colonoscopy 2018 Cologuard neg 09/2018   Partial Hysterectomy 1991 --     Polypectomy 10/08 --          Medication List  Name Date Started Instructions   Aspir-81 81 mg oral tablet,delayed release (DR/EC) 06/12/2020 take 1 tablet (81 mg) by oral route once daily   Blood Glucose Test miscellaneous strip 06/12/2020 use as directed to test blood sugar daily for Dx: E11.9   Bydureon BCise 2 mg/0.85 mL subcutaneous auto-injector 12/14/2020 inject 2 mg by subcutaneous route every 7 days in the abdomen, thighs, or outer area of upper arm rotating injection sites for 90 days   fexofenadine 180 mg oral tablet 12/14/2020 take 1 tablet (180 mg) by oral route once daily for 90 days   fluticasone propionate 50 mcg/actuation nasal spray,suspension 12/14/2020 spray 2 sprays (100 mcg) in each nostril by intranasal route once daily as needed for 30 days   Janumet XR 50-1,000 mg oral tablet, ER multiphase 24 hr 12/14/2020 take 1 tablet by oral route 2 times a day for 90 days   lancets Miscellaneous Misc 12/06/2017 use as directed   lisinopril 5 mg oral tablet 12/14/2020 take 1 tablet (5 mg) by oral route once daily for 90 days   nitroglycerin 0.2 mg/hr transdermal patch 24 hour 06/12/2020 apply 1 patch by transdermal route once daily remove at night for 10-12 hours for 90 days   Pepcid 20 mg oral tablet 12/14/2020 take 1 tablet by oral route 2 times a day as needed for 90 days   Pravachol 20 mg oral tablet 12/14/2020 take 1 tablet by oral route once a day (at bedtime) for 90 days   Protonix 40 mg oral tablet,delayed release (DR/EC) 12/14/2020 take 1 tablet (40 mg) by oral route once daily for 90 days   Synthroid 75 mcg oral tablet 12/14/2020 take 1 tablet (75 mcg) by oral route once daily for 90 days   Toprol XL 25 mg oral tablet extended release 24 hr 12/14/2020 take 1 tablet (25 mg) by oral route once daily for 90 days   vitamin B complex oral capsule  take 1 capsule by oral route daily   Vitamin D3 50 mcg (2,000 unit) oral tablet 12/14/2020 take 1 tablet by oral route daily for 90 days         Allergy  "List  Allergen Name Date Reaction Notes   NO KNOWN DRUG ALLERGIES --  --  --          Family Medical History  Disease Name Relative/Age Notes   Breast Neoplasm Sister/   --    Hypertension Father/   --    Lung cancer Sister/   --    Diabetes Mellitus, Type II Grandmother (maternal)/  Mother/   --          Reproductive History  Menstrual   Menopause Status: Postmenopausal HRT?: No   Pregnancy Summary   Total Pregnancies: 2 Full Term: 2 Premature: 0   Ab Induced: 0 Ab Spontaneous: 0 Ectopics: 0   Multiples: 0 Livin         Social History  Finding Status Start/Stop Quantity Notes   Alcohol Never --/-- --  --    Tobacco Former 35/53 1PPD X 15 years quit smoking approx          Immunizations  NameDate Admin Mfg Trade Name Lot Number Route Inj VIS Given VIS Publication   Fxbsvenuu51/2020 SKB Fluarix, quadrivalent, preservative free [id number] IM LD 10/09/2020    Comments: NDC: 35201-385-11. Pt tolerated well.   [id number] 2018 NE Not Entered  NE NE     Comments: Pt refused   Prevnar 13Refused 2018 NE Not Entered  NE NE     Comments: Pt refused         Review of Systems  · Constitutional  o Denies  o : fatigue, fever, weight gain, weight loss, chills  · Cardiovascular  o Denies  o : chest Pain, palpitations, edema (swelling)  · Respiratory  o Denies  o : frequent cough, shortness of breath  · Gastrointestinal  o Denies  o : nausea, vomiting, changes in bowel habits  · Genitourinary  o Denies  o : dysuria, urinary frequency, urinary urgency, polyuria  · Neurologic  o Denies  o : headache, tingling or numbness, dizziness  · Musculoskeletal  o Denies  o : joint pain, myalgias  · Endocrine  o Denies  o : polydipsia, polyphagia  · Psychiatric  o Denies  o : mood changes, memory changes, SI/HI      Vitals  Date Time BP Position Site L\R Cuff Size HR RR TEMP (F) WT  HT  BMI kg/m2 BSA m2 O2 Sat FR L/min FiO2 HC       2020 10:34 /52 Sitting    81 - R 12 97.6 161lbs 0oz 5'  7\" 25.22 1.86 96 %  " "    10/09/2020 10:10 /59 Sitting    71 - R 18 97.2 162lbs 2oz 5'  7\" 25.39 1.86 97 %      12/14/2020 09:26 /53 Sitting    78 - R 18 97.4 163lbs 0oz 5'  7\" 25.53 1.87 100 %            Physical Examination  · Constitutional  o Appearance  o : well-nourished, in no acute distress  · Eyes  o Conjunctivae  o : conjunctivae normal  o Sclerae  o : sclerae white  o Pupils and Irises  o : pupils equal and round  o Eyelids/Ocular Adnexae  o : eyelid appearance normal, no exudates present  · Neck  o Inspection/Palpation  o : normal appearance, no masses or tenderness, trachea midline  o Range of Motion  o : cervical range of motion within normal limits  o Thyroid  o : gland size normal, nontender, no nodules or masses present on palpation  · Respiratory  o Respiratory Effort  o : breathing unlabored  o Inspection of Chest  o : normal appearance  o Auscultation of Lungs  o : normal breath sounds throughout inspiration and expiration  · Cardiovascular  o Heart  o :   § Auscultation of Heart  § : regular rate and rhythm, no murmurs, gallops or rubs  o Peripheral Vascular System  o :   § Carotid Arteries  § : normal pulses bilaterally, no bruits present  § Extremities  § : no clubbing or edema  · Gastrointestinal  o Abdominal Examination  o : abdomen nontender to palpation, tone normal without rigidity or guarding, no masses present, bowel sounds present  · Skin and Subcutaneous Tissue  o General Inspection  o : no rashes or lesions present, no areas of discoloration  o Body Hair  o : hair normal for age, general body hair distribution normal for age  o Digits and Nails  o : no clubbing, cyanosis, deformities or edema present, normal appearing nails  · Neurologic  o Mental Status Examination  o :   § Orientation  § : grossly oriented to person, place and time  o Gait and Station  o : normal gait, able to stand without difficulty  · Psychiatric  o Judgement and Insight  o : judgment and insight intact  o Mood and " Affect  o : mood normal, affect appropriate          Assessment  · Type 2 diabetes mellitus without complication, without long-term current use of insulin     250.00/E11.9  · Essential hypertension     401.9/I10  · GERD (gastroesophageal reflux disease)     530.81/K21.9  · Hyperlipidemia     272.4/E78.5  · Hypothyroidism     244.9/E03.9  · Vitamin D deficiency     268.9/E55.9      Plan  · Orders  o Diabetes 1 Panel (CMP, Lipid, A1c) [facility] (70114, 66968, 92434) - - 12/14/2020  o Thyroid Profile (33640, 52732, THYII) - 244.9/E03.9 - 12/14/2020  o Vitamin D Level (58695) - 268.9/E55.9 - 12/14/2020  o ACO-39: Current medications updated and reviewed (1159F, ) - - 12/14/2020  · Medications  o Bydureon BCise 2 mg/0.85 mL subcutaneous auto-injector   SIG: inject 2 mg by subcutaneous route every 7 days in the abdomen, thighs, or outer area of upper arm rotating injection sites for 90 days   DISP: (12) Pen Needle with 1 refills  Refilled on 12/14/2020     o fexofenadine 180 mg oral tablet   SIG: take 1 tablet (180 mg) by oral route once daily for 90 days   DISP: (90) Tablet with 1 refills  Refilled on 12/14/2020     o fluticasone propionate 50 mcg/actuation nasal spray,suspension   SIG: spray 2 sprays (100 mcg) in each nostril by intranasal route once daily as needed for 30 days   DISP: (1) Bottle with 5 refills  Refilled on 12/14/2020     o Janumet XR 50-1,000 mg oral tablet, ER multiphase 24 hr   SIG: take 1 tablet by oral route 2 times a day for 90 days   DISP: (180) Tablet with 1 refills  Refilled on 12/14/2020     o lisinopril 5 mg oral tablet   SIG: take 1 tablet (5 mg) by oral route once daily for 90 days   DISP: (90) Tablet with 1 refills  Refilled on 12/14/2020     o Pepcid 20 mg oral tablet   SIG: take 1 tablet by oral route 2 times a day as needed for 90 days   DISP: (180) Tablet with 1 refills  Refilled on 12/14/2020     o Pravachol 20 mg oral tablet   SIG: take 1 tablet by oral route once a day (at bedtime)  for 90 days   DISP: (90) Tablet with 1 refills  Refilled on 12/14/2020     o Protonix 40 mg oral tablet,delayed release (DR/EC)   SIG: take 1 tablet (40 mg) by oral route once daily for 90 days   DISP: (90) Tablet with 1 refills  Refilled on 12/14/2020     o Synthroid 75 mcg oral tablet   SIG: take 1 tablet (75 mcg) by oral route once daily for 90 days   DISP: (90) Tablet with 1 refills  Refilled on 12/14/2020     o Toprol XL 25 mg oral tablet extended release 24 hr   SIG: take 1 tablet (25 mg) by oral route once daily for 90 days   DISP: (90) Tablet with 1 refills  Refilled on 12/14/2020     o Vitamin D3 50 mcg (2,000 unit) oral tablet   SIG: take 1 tablet by oral route daily for 90 days   DISP: (90) Tablet with 1 refills  Refilled on 12/14/2020     · Instructions  o Continue blood sugar monitoring daily and record. Bring your log to office visits. Call the office for readings below 70 and above 250 or any complications.  o Daily foot care. Avoid walking barefoot. Annual Dilated Eye Exam.  o Discussed with patient blood pressure monitoring, hemoglobin A1C levels need to be below 7.0, and LDL (Lipid) goals below 70.  o Patient advised to monitor blood pressure (B/P) at home and journal readings. Patient informed that a B/P reading at home of more than 130/80 is considered hypertension. For readings greater [id number] or higher patient is advised to follow up in the office with readings for management. Patient advised to limit sodium intake.  o Maintain a healthy weight. Avoid tight fitting clothes. Avoid fried, fatty foods, tomato sauce, chocolate, mint, garlic, onion, alcohol. caffeine. Eat smaller meals, dont lie down after a meal, dont smoke. Elevate the head of your bed 6-9 inches.  o Advised that cheeses and other sources of dairy fats, animal fats, fast food, and the extras (candy, pastries, pies, doughnuts and cookies) all contain LDL raising nutrients. Advised to increase fruits, vegetables, whole grains,  and to monitor portion sizes.   o Patient was educated/instructed on their diagnosis, treatment and medications prior to discharge from the clinic today.  o Call the office with any concerns or questions.  · Disposition  o Return to Office in 6 months.            Electronically Signed by: BRONSON Ruffin -Author on December 14, 2020 11:58:39 AM

## 2021-05-13 NOTE — PROGRESS NOTES
Progress Note      Patient Name: Elizabeth Dwyer   Patient ID: [id number]   Sex: Female   YOB: 1945    Primary Care Provider: Sheyla DOBSON   Referring Provider: Sheyla DOBSON    Visit Date: June 12, 2020    Provider: BRONSON Ruffin   Location: [facility]   Location Address: [address] [place] [address]  [id number]   Location Phone: [phone]          Chief Complaint  · 6 Month Follow up for Diabetes, GERD, HTN, Hyperlipidemia, Vitamin D Deficiency, and Hypothyroidism      History Of Present Illness  Elizabeth Dwyer is a 75 year old /White female who presents for evaluation and treatment of:      6 Month Follow up for Diabetes, GERD, HTN, Hyperlipidemia, Vitamin D Deficiency, and Hypothyroidism  Lab work due to be drawn. (orders previously placed in chart)  Med refills needed of all meds    Pt c/o allergies and wanting another script for Allegra allergy. controlled with meds.     Pt said she no longer see's Dr. Sena/Cardiology.  HTN controlled with meds.     GERD - pt admits to flares intermittent. pt reports she knows what foods flare her reflux occurs with those being eaten.     DM - pt reports compliance with meds. pt reports bs are 105.        flu shot- 10/2019  Pneu- declined  Colon- 2018 Cologuard  Mammo- 3/2019       Past Medical History  Disease Name Date Onset Notes   *Other 10/22/08 Diverticulosis W/O mention of hemorrhage (562.10)   Allergic rhinitis --  --    Bone Density Screening 1/30/18 --    Colon Polyps --  --    Cystocele/Rectocele 04/04/2008 --    Diabetes Mellitus, Type II --  --    Diverticulosis --  --    Gastroesophageal Reflux Disorder --  --    Hematuria 11/13/2012 Microscopic   Hyperlipidemia --  --    Hypertension --  --    Hypothyroidism --  --    Osteoporosis 08/17/2004 --    Screening Mammogram 3/12/19 --    Stress incontinence, female 04/04/2008 --    VitaminD Deficiency --  --          Past Surgical  History  Procedure Name Date Notes   Breast biopsy, right breast 1979 Benign   Cholecstectomy 1996 --    Colonoscopy 2018 Cologuard neg 09/2018   Partial Hysterectomy 1991 --    Polypectomy 10/08 --          Medication List  Name Date Started Instructions   Aspir-81 81 mg oral tablet,delayed release (DR/EC) 06/12/2020 take 1 tablet (81 mg) by oral route once daily   Blood Glucose Test miscellaneous strip 06/12/2020 use as directed to test blood sugar daily for Dx: E11.9   Bydureon BCise 2 mg/0.85 mL subcutaneous auto-injector 06/12/2020 inject 2 mg by subcutaneous route every 7 days in the abdomen, thighs, or outer area of upper arm rotating injection sites for 90 days   fluticasone propionate 50 mcg/actuation nasal spray,suspension 06/12/2020 spray 2 sprays (100 mcg) in each nostril by intranasal route once daily as needed for 30 days   Janumet XR 50-1,000 mg oral tablet, ER multiphase 24 hr 06/12/2020 take 1 tablet by oral route 2 times a day for 90 days   lancets Miscellaneous Misc 12/06/2017 use as directed   lisinopril 5 mg oral tablet 06/12/2020 take 1 tablet (5 mg) by oral route once daily for 90 days   nitroglycerin 0.2 mg/hr transdermal patch 24 hour 06/12/2020 apply 1 patch by transdermal route once daily remove at night for 10-12 hours for 90 days   Pepcid 20 mg oral tablet 06/12/2020 take 1 tablet by oral route 2 times a day as needed for 90 days   Pravachol 20 mg oral tablet 06/12/2020 take 1 tablet by oral route once a day (at bedtime) for 90 days   Protonix 40 mg oral tablet,delayed release (DR/EC) 06/12/2020 take 1 tablet (40 mg) by oral route once daily for 90 days   Synthroid 75 mcg oral tablet 06/12/2020 take 1 tablet (75 mcg) by oral route once daily for 90 days   Toprol XL 25 mg oral tablet extended release 24 hr 06/12/2020 take 1 tablet (25 mg) by oral route once daily for 90 days   vitamin B complex oral capsule  take 1 capsule by oral route daily   Vitamin D3 50 mcg (2,000 unit) oral tablet  2020 take 1 tablet by oral route daily for 90 days         Allergy List  Allergen Name Date Reaction Notes   NO KNOWN DRUG ALLERGIES --  --  --          Family Medical History  Disease Name Relative/Age Notes   Breast Neoplasm Sister/   --    Hypertension Father/   --    Lung cancer Sister/   --    Diabetes Mellitus, Type II Grandmother (maternal)/  Mother/   --          Reproductive History  Menstrual   Menopause Status: Postmenopausal HRT?: No   Pregnancy Summary   Total Pregnancies: 2 Full Term: 2 Premature: 0   Ab Induced: 0 Ab Spontaneous: 0 Ectopics: 0   Multiples: 0 Livin         Social History  Finding Status Start/Stop Quantity Notes   Alcohol Never --/-- --  --    Tobacco Former 35/53 1PPD X 15 years quit smoking approx          Immunizations  NameDate Admin Mfg Trade Name Lot Number Route Inj VIS Given VIS Publication   Otemrgugf63/2019 [facility] Fluzone Quadrivalent [id number] IM  10/11/2019    Comments: Pt tolerated   Cexfcjzaf98/26/2018 [facility] Fluzone Quadrivalent [id number] IM  2018   Comments: Pt tolerated   Qmpqueqwq59/06/2017 [facility] Fluzone Quadrivalent [id number] IM  2017   Comments: Pt tolerated   Kxgsrgwep11/15/2016 [facility] FLUZONE-HIGH DOSE [id number] IM  11/15/2016 2014   Comments: Pt tolerated well. Left office in stable condition.   Cqedlvvfi99/ [facility] Fluarix, quadrivalent, preservative free [id number] IM LD 10/19/2015 2012   Comments: Tolerated well and left office in stable condition.   Opjzbiyvo75Gtmzyjv 2018 NE Not Entered  NE NE     Comments: Pt refused   Prevnar 13Refused 2018 NE Not Entered  NE NE     Comments: Pt refused         Review of Systems  · Constitutional  o Denies  o : fatigue, fever, weight gain, weight loss, chills  · HENT  o Denies  o : ear pain, sore throat  · Cardiovascular  o Denies  o : chest Pain, palpitations, edema (swelling)  · Respiratory  o Denies  o : frequent cough, shortness of  "breath  · Gastrointestinal  o Denies  o : nausea, vomiting, changes in bowel habits  · Genitourinary  o Denies  o : dysuria, urinary frequency, urinary urgency, polyuria  · Neurologic  o Denies  o : headache, tingling or numbness, dizziness  · Musculoskeletal  o Denies  o : joint pain, myalgias  · Endocrine  o Denies  o : polydipsia, polyphagia  · Psychiatric  o Denies  o : mood changes, memory changes, SI/HI  · Allergic-Immunologic  o Denies  o : eczema, seasonal allergies, urticaria      Vitals  Date Time BP Position Site L\R Cuff Size HR RR TEMP (F) WT  HT  BMI kg/m2 BSA m2 O2 Sat HC       12/09/2019 10:42 /38 Sitting    80 - R 12  155lbs 2oz 5'  7\" 24.3 1.82 93 %    01/16/2020 01:43 /53 Sitting    86 - R 12 98.3 155lbs 16oz 5'  7\" 24.43 1.83 94 %    06/12/2020 10:34 /52 Sitting    81 - R 12 97.6 161lbs 0oz 5'  7\" 25.22 1.86 96 %          Physical Examination  · Constitutional  o Appearance  o : well-nourished, in no acute distress  · Eyes  o Conjunctivae  o : conjunctivae normal  o Sclerae  o : sclerae white  o Pupils and Irises  o : pupils equal and round  o Eyelids/Ocular Adnexae  o : eyelid appearance normal, no exudates present  · Neck  o Inspection/Palpation  o : normal appearance, no masses or tenderness, trachea midline  o Range of Motion  o : cervical range of motion within normal limits  o Thyroid  o : gland size normal, nontender, no nodules or masses present on palpation  · Respiratory  o Respiratory Effort  o : breathing unlabored  o Inspection of Chest  o : normal appearance  o Auscultation of Lungs  o : normal breath sounds throughout inspiration and expiration  · Cardiovascular  o Heart  o :   § Auscultation of Heart  § : regular rate and rhythm, no murmurs, gallops or rubs  o Peripheral Vascular System  o :   § Carotid Arteries  § : normal pulses bilaterally, no bruits present  § Extremities  § : no clubbing or edema  · Gastrointestinal  o Abdominal Examination  o : abdomen " nontender to palpation, tone normal without rigidity or guarding, no masses present, bowel sounds present  · Skin and Subcutaneous Tissue  o General Inspection  o : no rashes or lesions present, no areas of discoloration  o Body Hair  o : hair normal for age, general body hair distribution normal for age  o Digits and Nails  o : no clubbing, cyanosis, deformities or edema present, normal appearing nails  · Neurologic  o Mental Status Examination  o :   § Orientation  § : grossly oriented to person, place and time  o Gait and Station  o : normal gait, able to stand without difficulty  · Psychiatric  o Judgement and Insight  o : judgment and insight intact  o Mood and Affect  o : mood normal, affect appropriate              Assessment  · Allergic rhinitis due to allergen     477.9/J30.9  · Type 2 diabetes mellitus with hyperglycemia, without long-term current use of insulin       Type 2 diabetes mellitus with hyperglycemia     250.00/E11.65  · Essential hypertension     401.9/I10  · GERD (gastroesophageal reflux disease)     530.81/K21.9  · Hyperlipidemia     272.4/E78.5  · Hypothyroidism     244.9/E03.9  · Vitamin D deficiency     268.9/E55.9  · Ventricular bigeminy     427.89/I49.9      Plan  · Orders  o ACO-39: Current medications updated and reviewed () - - 06/12/2020  o Urine Culture [facility] ([id number]) - - 06/12/2020  · Medications  o fexofenadine 180 mg oral tablet   SIG: take 1 tablet (180 mg) by oral route once daily for 90 days   DISP: (90) tablets with 1 refills  Prescribed on 06/12/2020     o Aspir-81 81 mg oral tablet,delayed release (DR/EC)   SIG: take 1 tablet (81 mg) by oral route once daily   DISP: (90) tablets with 3 refills  Refilled on 06/12/2020     o Blood Glucose Test miscellaneous strip   SIG: use as directed to test blood sugar daily for Dx: E11.9   DISP: (1) 100 ct box with 3 refills  Refilled on 06/12/2020     o Bydureon BCise 2 mg/0.85 mL subcutaneous auto-injector   SIG: inject 2 mg by  subcutaneous route every 7 days in the abdomen, thighs, or outer area of upper arm rotating injection sites for 90 days   DISP: (12) Auto-injectors with 1 refills  Refilled on 06/12/2020     o fluticasone propionate 50 mcg/actuation nasal spray,suspension   SIG: spray 2 sprays (100 mcg) in each nostril by intranasal route once daily as needed for 30 days   DISP: (1) 16 gm aer w/adap with 5 refills  Refilled on 06/12/2020     o Janumet XR 50-1,000 mg oral tablet, ER multiphase 24 hr   SIG: take 1 tablet by oral route 2 times a day for 90 days   DISP: (180) tablets with 1 refills  Refilled on 06/12/2020     o lisinopril 5 mg oral tablet   SIG: take 1 tablet (5 mg) by oral route once daily for 90 days   DISP: (90) tablet with 1 refills  Refilled on 06/12/2020     o nitroglycerin 0.2 mg/hr transdermal patch 24 hour   SIG: apply 1 patch by transdermal route once daily remove at night for 10-12 hours for 90 days   DISP: (90) patch with 1 refills  Refilled on 06/12/2020     o Pepcid 20 mg oral tablet   SIG: take 1 tablet by oral route 2 times a day as needed for 90 days   DISP: (180) tablets with 1 refills  Refilled on 06/12/2020     o Pravachol 20 mg oral tablet   SIG: take 1 tablet by oral route once a day (at bedtime) for 90 days   DISP: (90) tablets with 1 refills  Refilled on 06/12/2020     o Protonix 40 mg oral tablet,delayed release (DR/EC)   SIG: take 1 tablet (40 mg) by oral route once daily for 90 days   DISP: (90) tablets with 1 refills  Refilled on 06/12/2020     o Synthroid 75 mcg oral tablet   SIG: take 1 tablet (75 mcg) by oral route once daily for 90 days   DISP: (90) tablets with 1 refills  Refilled on 06/12/2020     o Toprol XL 25 mg oral tablet extended release 24 hr   SIG: take 1 tablet (25 mg) by oral route once daily for 90 days   DISP: (90) tablets with 1 refills  Refilled on 06/12/2020     o Vitamin D3 2,000 unit oral tablet   SIG: take 1 tablet by oral route daily for 90 days   DISP: (90) tablets  with 1 refills  Refilled on 06/12/2020     o Medications have been Reconciled  o Transition of Care or Provider Policy  · Instructions  o Continue blood sugar monitoring daily and record. Bring your log to office visits. Call the office for readings below 70 and above 250 or any complications.  o Daily foot care. Avoid walking barefoot. Annual Dilated Eye Exam.  o Discussed with patient blood pressure monitoring, hemoglobin A1C levels need to be below 7.0, and LDL (Lipid) goals below 70.  o Patient advised to monitor blood pressure (B/P) at home and journal readings. Patient informed that a B/P reading at home of more than 130/80 is considered hypertension. For readings greater zoif761/90 or higher patient is advised to follow up in the office with readings for management. Patient advised to limit sodium intake.  o Maintain a healthy weight. Avoid tight fitting clothes. Avoid fried, fatty foods, tomato sauce, chocolate, mint, garlic, onion, alcohol. caffeine. Eat smaller meals, dont lie down after a meal, dont smoke. Elevate the head of your bed 6-9 inches.  o Advised that cheeses and other sources of dairy fats, animal fats, fast food, and the extras (candy, pastries, pies, doughnuts and cookies) all contain LDL raising nutrients. Advised to increase fruits, vegetables, whole grains, and to monitor portion sizes.   o Patient was educated/instructed on their diagnosis, treatment and medications prior to discharge from the clinic today.  o Call the office with any concerns or questions.  · Disposition  o Return to Office in 6 months.            Electronically Signed by: BRONSON Ruffin -Author on June 17, 2020 01:36:12 PM

## 2021-05-14 VITALS
DIASTOLIC BLOOD PRESSURE: 53 MMHG | SYSTOLIC BLOOD PRESSURE: 126 MMHG | HEART RATE: 78 BPM | TEMPERATURE: 97.4 F | OXYGEN SATURATION: 100 % | WEIGHT: 163 LBS | RESPIRATION RATE: 18 BRPM | BODY MASS INDEX: 25.58 KG/M2 | HEIGHT: 67 IN

## 2021-05-14 VITALS
TEMPERATURE: 97.2 F | HEIGHT: 67 IN | BODY MASS INDEX: 25.45 KG/M2 | DIASTOLIC BLOOD PRESSURE: 59 MMHG | RESPIRATION RATE: 18 BRPM | SYSTOLIC BLOOD PRESSURE: 120 MMHG | HEART RATE: 71 BPM | WEIGHT: 162.12 LBS | OXYGEN SATURATION: 97 %

## 2021-05-15 VITALS
BODY MASS INDEX: 25.27 KG/M2 | WEIGHT: 161 LBS | DIASTOLIC BLOOD PRESSURE: 52 MMHG | HEIGHT: 67 IN | TEMPERATURE: 97.6 F | HEART RATE: 81 BPM | OXYGEN SATURATION: 96 % | SYSTOLIC BLOOD PRESSURE: 113 MMHG | RESPIRATION RATE: 12 BRPM

## 2021-05-15 VITALS
OXYGEN SATURATION: 93 % | WEIGHT: 155.12 LBS | HEIGHT: 67 IN | BODY MASS INDEX: 24.35 KG/M2 | DIASTOLIC BLOOD PRESSURE: 38 MMHG | RESPIRATION RATE: 12 BRPM | HEART RATE: 80 BPM | SYSTOLIC BLOOD PRESSURE: 131 MMHG

## 2021-05-15 VITALS
HEART RATE: 86 BPM | OXYGEN SATURATION: 94 % | DIASTOLIC BLOOD PRESSURE: 53 MMHG | RESPIRATION RATE: 12 BRPM | WEIGHT: 156 LBS | HEIGHT: 67 IN | SYSTOLIC BLOOD PRESSURE: 133 MMHG | TEMPERATURE: 98.3 F | BODY MASS INDEX: 24.48 KG/M2

## 2021-05-15 VITALS
TEMPERATURE: 98.1 F | BODY MASS INDEX: 24.64 KG/M2 | WEIGHT: 157 LBS | HEIGHT: 67 IN | HEART RATE: 78 BPM | RESPIRATION RATE: 28 BRPM | SYSTOLIC BLOOD PRESSURE: 112 MMHG | DIASTOLIC BLOOD PRESSURE: 55 MMHG | OXYGEN SATURATION: 97 %

## 2021-05-15 VITALS
DIASTOLIC BLOOD PRESSURE: 66 MMHG | BODY MASS INDEX: 24.33 KG/M2 | WEIGHT: 155 LBS | SYSTOLIC BLOOD PRESSURE: 100 MMHG | HEART RATE: 76 BPM | HEIGHT: 67 IN

## 2021-05-16 VITALS
DIASTOLIC BLOOD PRESSURE: 69 MMHG | HEART RATE: 110 BPM | BODY MASS INDEX: 26.21 KG/M2 | WEIGHT: 167 LBS | TEMPERATURE: 97.7 F | OXYGEN SATURATION: 97 % | RESPIRATION RATE: 20 BRPM | HEIGHT: 67 IN | SYSTOLIC BLOOD PRESSURE: 137 MMHG

## 2021-05-16 VITALS
BODY MASS INDEX: 25.58 KG/M2 | DIASTOLIC BLOOD PRESSURE: 42 MMHG | SYSTOLIC BLOOD PRESSURE: 105 MMHG | HEART RATE: 79 BPM | TEMPERATURE: 97.7 F | OXYGEN SATURATION: 97 % | HEIGHT: 67 IN | WEIGHT: 163 LBS | RESPIRATION RATE: 21 BRPM

## 2021-05-16 VITALS
RESPIRATION RATE: 20 BRPM | OXYGEN SATURATION: 98 % | WEIGHT: 165 LBS | TEMPERATURE: 97.6 F | HEIGHT: 67 IN | DIASTOLIC BLOOD PRESSURE: 52 MMHG | BODY MASS INDEX: 25.9 KG/M2 | HEART RATE: 73 BPM | SYSTOLIC BLOOD PRESSURE: 116 MMHG

## 2021-05-16 VITALS
SYSTOLIC BLOOD PRESSURE: 110 MMHG | DIASTOLIC BLOOD PRESSURE: 60 MMHG | RESPIRATION RATE: 20 BRPM | HEART RATE: 82 BPM | HEIGHT: 67 IN | TEMPERATURE: 97.6 F | WEIGHT: 166 LBS | OXYGEN SATURATION: 98 % | BODY MASS INDEX: 26.06 KG/M2

## 2021-05-16 VITALS
HEART RATE: 72 BPM | WEIGHT: 163 LBS | DIASTOLIC BLOOD PRESSURE: 64 MMHG | HEIGHT: 67 IN | SYSTOLIC BLOOD PRESSURE: 106 MMHG | BODY MASS INDEX: 25.58 KG/M2

## 2021-05-16 VITALS
TEMPERATURE: 97.6 F | HEIGHT: 67 IN | SYSTOLIC BLOOD PRESSURE: 100 MMHG | HEART RATE: 79 BPM | RESPIRATION RATE: 23 BRPM | BODY MASS INDEX: 25.58 KG/M2 | DIASTOLIC BLOOD PRESSURE: 40 MMHG | WEIGHT: 163 LBS | OXYGEN SATURATION: 98 %

## 2021-05-16 VITALS
WEIGHT: 163 LBS | TEMPERATURE: 97.7 F | HEIGHT: 67 IN | OXYGEN SATURATION: 98 % | HEART RATE: 95 BPM | SYSTOLIC BLOOD PRESSURE: 100 MMHG | RESPIRATION RATE: 30 BRPM | DIASTOLIC BLOOD PRESSURE: 59 MMHG | BODY MASS INDEX: 25.58 KG/M2

## 2021-06-09 PROBLEM — J30.9 ALLERGIC RHINITIS: Status: ACTIVE | Noted: 2021-06-09

## 2021-06-09 PROBLEM — K21.9 GERD (GASTROESOPHAGEAL REFLUX DISEASE): Status: ACTIVE | Noted: 2021-06-09

## 2021-06-09 PROBLEM — E78.5 HYPERLIPIDEMIA: Status: ACTIVE | Noted: 2021-06-09

## 2021-06-09 PROBLEM — I10 HYPERTENSION: Status: ACTIVE | Noted: 2021-06-09

## 2021-06-09 PROBLEM — E11.9 TYPE 2 DIABETES MELLITUS WITHOUT COMPLICATION (HCC): Status: ACTIVE | Noted: 2021-06-09

## 2021-06-09 PROBLEM — M81.0 OSTEOPOROSIS: Status: ACTIVE | Noted: 2021-06-09

## 2021-06-09 PROBLEM — E55.9 VITAMIN D DEFICIENCY: Status: ACTIVE | Noted: 2021-06-09

## 2021-06-09 PROBLEM — E03.9 HYPOTHYROIDISM: Status: ACTIVE | Noted: 2021-06-09

## 2021-06-09 RX ORDER — FLUTICASONE PROPIONATE 50 MCG
SPRAY, SUSPENSION (ML) NASAL
COMMUNITY
Start: 2021-04-15 | End: 2021-06-10 | Stop reason: SDUPTHER

## 2021-06-09 RX ORDER — LEVOTHYROXINE SODIUM 75 MCG
75 TABLET ORAL DAILY
COMMUNITY
Start: 2021-03-08 | End: 2021-06-10 | Stop reason: SDUPTHER

## 2021-06-09 RX ORDER — EXENATIDE 2 MG/.85ML
INJECTION, SUSPENSION, EXTENDED RELEASE SUBCUTANEOUS
COMMUNITY
Start: 2021-03-08 | End: 2021-06-10 | Stop reason: SDUPTHER

## 2021-06-09 RX ORDER — PRAVASTATIN SODIUM 20 MG
20 TABLET ORAL DAILY
COMMUNITY
Start: 2021-04-27 | End: 2021-06-10 | Stop reason: SDUPTHER

## 2021-06-09 RX ORDER — PANTOPRAZOLE SODIUM 40 MG/1
40 TABLET, DELAYED RELEASE ORAL DAILY
COMMUNITY
Start: 2021-03-11 | End: 2021-06-10 | Stop reason: SDUPTHER

## 2021-06-09 RX ORDER — SITAGLIPTIN AND METFORMIN HYDROCHLORIDE 1000; 50 MG/1; MG/1
1 TABLET, FILM COATED, EXTENDED RELEASE ORAL 2 TIMES DAILY
COMMUNITY
Start: 2021-04-15 | End: 2021-06-10 | Stop reason: SDUPTHER

## 2021-06-09 RX ORDER — ASPIRIN 81 MG/1
81 TABLET, COATED ORAL DAILY
COMMUNITY
Start: 2021-03-08 | End: 2021-06-10 | Stop reason: SDUPTHER

## 2021-06-09 RX ORDER — LISINOPRIL 5 MG/1
5 TABLET ORAL DAILY
COMMUNITY
Start: 2021-03-11 | End: 2021-06-10 | Stop reason: SDUPTHER

## 2021-06-09 RX ORDER — FEXOFENADINE HCL 180 MG/1
180 TABLET ORAL DAILY
COMMUNITY
Start: 2021-03-08 | End: 2021-06-10 | Stop reason: SDUPTHER

## 2021-06-10 ENCOUNTER — OFFICE VISIT (OUTPATIENT)
Dept: FAMILY MEDICINE CLINIC | Facility: CLINIC | Age: 76
End: 2021-06-10

## 2021-06-10 VITALS
SYSTOLIC BLOOD PRESSURE: 137 MMHG | RESPIRATION RATE: 12 BRPM | DIASTOLIC BLOOD PRESSURE: 51 MMHG | BODY MASS INDEX: 25.15 KG/M2 | OXYGEN SATURATION: 96 % | WEIGHT: 160.2 LBS | HEART RATE: 88 BPM | TEMPERATURE: 97.4 F | HEIGHT: 67 IN

## 2021-06-10 DIAGNOSIS — E78.2 MIXED HYPERLIPIDEMIA: Primary | ICD-10-CM

## 2021-06-10 DIAGNOSIS — E06.3 HYPOTHYROIDISM DUE TO HASHIMOTO'S THYROIDITIS: ICD-10-CM

## 2021-06-10 DIAGNOSIS — K21.9 GASTROESOPHAGEAL REFLUX DISEASE WITHOUT ESOPHAGITIS: ICD-10-CM

## 2021-06-10 DIAGNOSIS — Z12.39 BREAST SCREENING: ICD-10-CM

## 2021-06-10 DIAGNOSIS — E03.8 HYPOTHYROIDISM DUE TO HASHIMOTO'S THYROIDITIS: ICD-10-CM

## 2021-06-10 DIAGNOSIS — E55.9 VITAMIN D DEFICIENCY: ICD-10-CM

## 2021-06-10 DIAGNOSIS — E11.9 TYPE 2 DIABETES MELLITUS WITHOUT COMPLICATION, WITHOUT LONG-TERM CURRENT USE OF INSULIN (HCC): ICD-10-CM

## 2021-06-10 DIAGNOSIS — I10 ESSENTIAL HYPERTENSION: ICD-10-CM

## 2021-06-10 PROCEDURE — 99214 OFFICE O/P EST MOD 30 MIN: CPT | Performed by: NURSE PRACTITIONER

## 2021-06-10 RX ORDER — FLUTICASONE PROPIONATE 50 MCG
2 SPRAY, SUSPENSION (ML) NASAL DAILY
Qty: 15 ML | Refills: 5 | Status: SHIPPED | OUTPATIENT
Start: 2021-06-10

## 2021-06-10 RX ORDER — LEVOTHYROXINE SODIUM 75 MCG
75 TABLET ORAL DAILY
Qty: 90 TABLET | Refills: 1 | Status: SHIPPED | OUTPATIENT
Start: 2021-06-10

## 2021-06-10 RX ORDER — PANTOPRAZOLE SODIUM 40 MG/1
40 TABLET, DELAYED RELEASE ORAL DAILY
Qty: 90 TABLET | Refills: 1 | Status: SHIPPED | OUTPATIENT
Start: 2021-06-10

## 2021-06-10 RX ORDER — LISINOPRIL 5 MG/1
5 TABLET ORAL DAILY
Qty: 90 TABLET | Refills: 1 | Status: SHIPPED | OUTPATIENT
Start: 2021-06-10

## 2021-06-10 RX ORDER — ASPIRIN 81 MG/1
81 TABLET, COATED ORAL DAILY
Qty: 90 TABLET | Refills: 3 | Status: SHIPPED | OUTPATIENT
Start: 2021-06-10

## 2021-06-10 RX ORDER — FEXOFENADINE HCL 180 MG/1
180 TABLET ORAL DAILY
Qty: 90 TABLET | Refills: 3 | Status: SHIPPED | OUTPATIENT
Start: 2021-06-10

## 2021-06-10 RX ORDER — NITROGLYCERIN 40 MG/1
1 PATCH TRANSDERMAL DAILY
COMMUNITY
End: 2021-06-10 | Stop reason: SDUPTHER

## 2021-06-10 RX ORDER — SITAGLIPTIN AND METFORMIN HYDROCHLORIDE 1000; 50 MG/1; MG/1
1 TABLET, FILM COATED, EXTENDED RELEASE ORAL 2 TIMES DAILY
Qty: 90 TABLET | Refills: 1 | Status: SHIPPED | OUTPATIENT
Start: 2021-06-10

## 2021-06-10 RX ORDER — PRAVASTATIN SODIUM 20 MG
20 TABLET ORAL DAILY
Qty: 90 TABLET | Refills: 1 | Status: SHIPPED | OUTPATIENT
Start: 2021-06-10

## 2021-06-10 RX ORDER — EXENATIDE 2 MG/.85ML
2 INJECTION, SUSPENSION, EXTENDED RELEASE SUBCUTANEOUS WEEKLY
Qty: 12 PEN | Refills: 1 | Status: SHIPPED | OUTPATIENT
Start: 2021-06-10

## 2021-06-10 RX ORDER — AMOXICILLIN 500 MG/1
1 CAPSULE ORAL DAILY
COMMUNITY
Start: 2021-06-03 | End: 2021-11-03

## 2021-06-10 RX ORDER — FLUTICASONE PROPIONATE 50 MCG
2 SPRAY, SUSPENSION (ML) NASAL DAILY
Qty: 15 ML | Refills: 5 | Status: SHIPPED | OUTPATIENT
Start: 2021-06-10 | End: 2021-06-10

## 2021-06-10 RX ORDER — NITROGLYCERIN 40 MG/1
1 PATCH TRANSDERMAL DAILY
Qty: 100 PATCH | Refills: 1 | Status: SHIPPED | OUTPATIENT
Start: 2021-06-10

## 2021-06-10 RX ORDER — CHOLECALCIFEROL (VITAMIN D3) 50 MCG
2000 TABLET ORAL DAILY
COMMUNITY

## 2021-06-10 NOTE — PROGRESS NOTES
Chief Complaint  Hypertension, Heartburn, Hypothyroidism, Diabetes, Hyperlipidemia, and Vitamin D Deficiency    Subjective          Elizabeth Dwyer presents to [facility]  History of Present Illness     HTN - well controlled.    Pt had c'scope at [facility]. Will obtain records.     Hypothyroidism - tsh wnl. Pt reports compliance with meds. Denies any symtpoms of.     DM - well controlled. bs this morning was 102.    Hyperlipidemia - denies any myalgias.     Vit D - normal pt takes daily.         Review of Systems   Constitutional: Negative for fatigue.   Respiratory: Negative for cough and shortness of breath.    Cardiovascular: Negative for chest pain and palpitations.   Gastrointestinal: Negative for nausea and vomiting.   Endocrine: Negative for cold intolerance and heat intolerance.   Genitourinary: Negative for difficulty urinating and dysuria.   Musculoskeletal: Negative for arthralgias, gait problem and joint swelling.   Neurological: Negative for dizziness, seizures and headaches.   Hematological: Negative for adenopathy. Does not bruise/bleed easily.   Psychiatric/Behavioral: Negative for confusion, dysphoric mood and sleep disturbance. The patient is not nervous/anxious.           Medical History: has a past medical history of Allergic rhinitis, Colon polyps, Cystocele with rectocele (04/04/2008), Diabetes mellitus, type 2 (CMS/[facility]), Diverticulosis of colon without hemorrhage (10/22/2008), Gastroesophageal reflux disease, Hematuria (11/13/2012), Hyperlipidemia, Hypertension, Hypothyroidism, Osteoporosis (08/17/2004), Stress incontinence in female (04/04/2008), Type 2 diabetes mellitus without complication (CMS/HCC) (6/9/2021), and Vitamin D deficiency.     Surgical History: has a past surgical history that includes Breast biopsy (Right); Cholecystectomy (1996); Colonoscopy (2018); Hysterectomy (1991); and Polypectomy (10/2008).     Family History: family history includes  Breast cancer in her sister; Diabetes type II in her maternal grandmother and mother; Hypertension in her father; Lung cancer in her sister.     Social History: reports that she has quit smoking. She does not have any smokeless tobacco history on file. She reports that she does not drink alcohol.    Allergies: Patient has no known allergies.      Health Maintenance Due   Topic Date Due   • DXA SCAN  Never done   • TDAP/TD VACCINES (1 - Tdap) Never done   • ZOSTER VACCINE (1 of 2) Never done   • Pneumococcal Vaccine 65+ (1 of 1 - PPSV23) Never done   • HEPATITIS C SCREENING  Never done   • ANNUAL WELLNESS VISIT  Never done            Current Outpatient Medications:   •  amoxicillin (AMOXIL) 500 MG capsule, Take 1 capsule by mouth Daily., Disp: , Rfl:   •  B Complex Vitamins (VITAMIN B COMPLEX PO), Take 1 tablet by mouth Daily., Disp: , Rfl:   •  Cholecalciferol (Vitamin D) 50 MCG (2000 UT) tablet, Take 2,000 Units by mouth Daily., Disp: , Rfl:   •  nitroglycerin (NITRODUR) 0.2 MG/HR patch, Place 1 patch on the skin as directed by provider Daily., Disp: 100 patch, Rfl: 1  •  Aspirin Low Dose 81 MG EC tablet, Take 1 tablet by mouth Daily., Disp: 90 tablet, Rfl: 3  •  Bydureon BCise 2 MG/0.85ML auto-injector injection, Inject 0.85 mL under the skin into the appropriate area as directed 1 (One) Time Per Week., Disp: 12 pen, Rfl: 1  •  fexofenadine (ALLEGRA) 180 MG tablet, Take 1 tablet by mouth Daily., Disp: 90 tablet, Rfl: 3  •  fluticasone (FLONASE) 50 MCG/ACT nasal spray, 2 sprays into the nostril(s) as directed by provider Daily., Disp: 15 mL, Rfl: 5  •  Janumet XR  MG tablet, Take 1 tablet by mouth 2 (two) times a day., Disp: 90 tablet, Rfl: 1  •  lisinopril (PRINIVIL,ZESTRIL) 5 MG tablet, Take 1 tablet by mouth Daily., Disp: 90 tablet, Rfl: 1  •  pantoprazole (PROTONIX) 40 MG EC tablet, Take 1 tablet by mouth Daily., Disp: 90 tablet, Rfl: 1  •  pravastatin (PRAVACHOL) 20 MG tablet, Take 1 tablet by mouth  "Daily., Disp: 90 tablet, Rfl: 1  •  Synthroid 75 MCG tablet, Take 1 tablet by mouth Daily., Disp: 90 tablet, Rfl: 1      Immunization History   Administered Date(s) Administered   • COVID-19 (MODERNA) 02/24/2021, 03/25/2021   • Fluzone High Dose =>65 Years ([employer] ONLY) 10/17/2020   • Influenza, Unspecified 10/09/2020         Objective       Vitals:    06/10/21 1008   BP: 137/51   Pulse: 88   Resp: 12   Temp: 97.4 °F (36.3 °C)   TempSrc: Temporal   SpO2: 96%   Weight: 72.7 kg (160 lb 3.2 oz)   Height: 170.2 cm (67\")     Body mass index is 25.09 kg/m².   Wt Readings from Last 3 Encounters:   06/10/21 72.7 kg (160 lb 3.2 oz)   12/14/20 73.9 kg (163 lb)   10/09/20 73.5 kg (162 lb 2 oz)      BP Readings from Last 3 Encounters:   06/10/21 137/51   12/14/20 126/53   10/09/20 120/59        Physical Exam  Constitutional:       General: She is not in acute distress.     Appearance: She is well-developed.   Cardiovascular:      Rate and Rhythm: Normal rate and regular rhythm.      Pulses:           Dorsalis pedis pulses are 2+ on the right side and 2+ on the left side.        Posterior tibial pulses are 2+ on the right side and 2+ on the left side.      Heart sounds: Normal heart sounds. No murmur heard.     Pulmonary:      Effort: Pulmonary effort is normal.      Breath sounds: Normal breath sounds. No wheezing or rales.   Abdominal:      General: Bowel sounds are normal. There is no distension.      Palpations: Abdomen is soft.   Musculoskeletal:         General: Normal range of motion.      Right foot: Normal range of motion. No deformity or foot drop.      Left foot: Normal range of motion. No deformity or foot drop.   Feet:      Right foot:      Protective Sensation: 8 sites tested. 8 sites sensed.      Skin integrity: Skin integrity normal.      Toenail Condition: Right toenails are normal.      Left foot:      Protective Sensation: 8 sites tested. 8 sites sensed.      Skin integrity: Skin integrity normal.      " Toenail Condition: Left toenails are normal.   Skin:     General: Skin is warm and dry.   Neurological:      Mental Status: She is alert and oriented to person, place, and time.   Psychiatric:         Behavior: Behavior normal.             Result Review :     The following data was reviewed by: BRONSON Ruffin on 06/10/2021:    CMP    CMP 12/14/20 6/3/21   Glucose 145 (A) 83   BUN 12 12   Creatinine 0.84 1.04 (A)   Sodium 139 140   Potassium 4.1 4.4   Chloride 102 104   Calcium 9.7 9.8   Albumin 4.2 4.4   Total Bilirubin 0.53 0.64   Alkaline Phosphatase 89 69   AST (SGOT) 36 26   ALT (SGPT) 28 18   (A) Abnormal value            Lipid Panel    Lipid Panel 12/14/20 6/3/21   Total Cholesterol 134 139   Triglycerides 113 81   HDL Cholesterol 43 54   VLDL Cholesterol 23 16   LDL Cholesterol  68 (A) 69 (A)   (A) Abnormal value       Comments are available for some flowsheets but are not being displayed.           TSH    TSH 12/14/20 6/3/21   TSH 1.100 1.350                 Data reviewed: labs, will obtain c'scope.              Assessment and Plan        Diagnoses and all orders for this visit:    1. Mixed hyperlipidemia (Primary)    2. Essential hypertension    3. Type 2 diabetes mellitus without complication, without long-term current use of insulin (CMS/[facility])    4. Hypothyroidism due to Hashimoto's thyroiditis    5. Vitamin D deficiency    6. Gastroesophageal reflux disease without esophagitis    7. Breast screening  -     Mammo Screening Digital Tomosynthesis Bilateral With CAD; Future    Other orders  -     Aspirin Low Dose 81 MG EC tablet; Take 1 tablet by mouth Daily.  Dispense: 90 tablet; Refill: 3  -     Bydureon BCise 2 MG/0.85ML auto-injector injection; Inject 0.85 mL under the skin into the appropriate area as directed 1 (One) Time Per Week.  Dispense: 12 pen; Refill: 1  -     fexofenadine (ALLEGRA) 180 MG tablet; Take 1 tablet by mouth Daily.  Dispense: 90 tablet; Refill: 3  -     Discontinue: fluticasone  (FLONASE) 50 MCG/ACT nasal spray; 2 sprays into the nostril(s) as directed by provider Daily.  Dispense: 15 mL; Refill: 5  -     Janumet XR  MG tablet; Take 1 tablet by mouth 2 (two) times a day.  Dispense: 90 tablet; Refill: 1  -     lisinopril (PRINIVIL,ZESTRIL) 5 MG tablet; Take 1 tablet by mouth Daily.  Dispense: 90 tablet; Refill: 1  -     nitroglycerin (NITRODUR) 0.2 MG/HR patch; Place 1 patch on the skin as directed by provider Daily.  Dispense: 100 patch; Refill: 1  -     pantoprazole (PROTONIX) 40 MG EC tablet; Take 1 tablet by mouth Daily.  Dispense: 90 tablet; Refill: 1  -     pravastatin (PRAVACHOL) 20 MG tablet; Take 1 tablet by mouth Daily.  Dispense: 90 tablet; Refill: 1  -     Synthroid 75 MCG tablet; Take 1 tablet by mouth Daily.  Dispense: 90 tablet; Refill: 1  -     fluticasone (FLONASE) 50 MCG/ACT nasal spray; 2 sprays into the nostril(s) as directed by provider Daily.  Dispense: 15 mL; Refill: 5          Follow Up     Return in about 1 month (around 7/10/2021).    Patient was given instructions and counseling regarding her condition or for health maintenance advice. Please see specific information pulled into the AVS if appropriate.     BRONSON Ruffin

## 2021-06-28 DIAGNOSIS — I10 HYPERTENSION, UNSPECIFIED TYPE: Primary | ICD-10-CM

## 2021-06-28 RX ORDER — METOPROLOL SUCCINATE 25 MG/1
25 TABLET, EXTENDED RELEASE ORAL DAILY
COMMUNITY
End: 2021-06-28 | Stop reason: SDUPTHER

## 2021-06-28 RX ORDER — METOPROLOL SUCCINATE 25 MG/1
25 TABLET, EXTENDED RELEASE ORAL DAILY
Qty: 90 TABLET | Refills: 1 | Status: SHIPPED | OUTPATIENT
Start: 2021-06-28 | End: 2021-07-01 | Stop reason: SDUPTHER

## 2021-06-28 NOTE — TELEPHONE ENCOUNTER
Caller: Elizabeth Dwyer    Relationship: Self    Best call back number: [phone]    Medication needed: METOPROLOL 25 MG     When do you need the refill by: ASAP     What additional details did the patient provide when requesting the medication: PATIENT HAS 3 PILLS LEFT     Does the patient have less than a 3 day supply:  [x] Yes  [] No    What is the patient's preferred pharmacy: PATIENT IS WANTING TO  A PAPER COPY

## 2021-06-28 NOTE — TELEPHONE ENCOUNTER
Med refill request for Toprol XL 25mg QD. Last Ov 6/10/21. Was not filled at last Ov. Pt wants printed Rx.

## 2021-07-01 DIAGNOSIS — I10 HYPERTENSION, UNSPECIFIED TYPE: ICD-10-CM

## 2021-07-01 RX ORDER — METOPROLOL SUCCINATE 25 MG/1
25 TABLET, EXTENDED RELEASE ORAL DAILY
Qty: 90 TABLET | Refills: 1 | Status: SHIPPED | OUTPATIENT
Start: 2021-07-01

## 2021-08-03 ENCOUNTER — HOSPITAL ENCOUNTER (OUTPATIENT)
Dept: MAMMOGRAPHY | Facility: HOSPITAL | Age: 76
Discharge: HOME | End: 2021-08-03
Admitting: NURSE PRACTITIONER

## 2021-08-03 DIAGNOSIS — Z12.39 BREAST SCREENING: ICD-10-CM

## 2021-08-03 PROCEDURE — 77063 BREAST TOMOSYNTHESIS BI: CPT

## 2021-08-03 PROCEDURE — 77067 SCR MAMMO BI INCL CAD: CPT

## 2021-10-15 ENCOUNTER — CLINICAL SUPPORT (OUTPATIENT)
Dept: FAMILY MEDICINE CLINIC | Facility: CLINIC | Age: 76
End: 2021-10-15

## 2021-10-15 DIAGNOSIS — Z23 NEED FOR INFLUENZA VACCINATION: Primary | ICD-10-CM

## 2021-10-15 PROCEDURE — 90662 IIV NO PRSV INCREASED AG IM: CPT | Performed by: NURSE PRACTITIONER

## 2021-10-15 PROCEDURE — G0008 ADMIN INFLUENZA VIRUS VAC: HCPCS | Performed by: NURSE PRACTITIONER

## 2021-11-03 PROCEDURE — 87077 CULTURE AEROBIC IDENTIFY: CPT | Performed by: FAMILY MEDICINE

## 2021-11-03 PROCEDURE — 87186 SC STD MICRODIL/AGAR DIL: CPT | Performed by: FAMILY MEDICINE

## 2021-11-03 PROCEDURE — 87086 URINE CULTURE/COLONY COUNT: CPT | Performed by: FAMILY MEDICINE

## 2021-11-06 ENCOUNTER — TELEPHONE (OUTPATIENT)
Dept: URGENT CARE | Facility: CLINIC | Age: 76
End: 2021-11-06

## 2021-11-06 NOTE — TELEPHONE ENCOUNTER
Patient is contacted regarding urine culture results.  She is positive for Citrobacter.  This is sensitive to Macrobid. plan: Finish antibiotic course.  Follow-up with primary care provider if symptoms or not resolved.

## 2022-01-26 ENCOUNTER — TELEPHONE (OUTPATIENT)
Dept: FAMILY MEDICINE CLINIC | Facility: CLINIC | Age: 77
End: 2022-01-26

## 2022-03-01 NOTE — TELEPHONE ENCOUNTER
I looked for a signed release of information, I will send to [facility] so they can send patient her medical records.

## 2023-09-28 ENCOUNTER — OFFICE VISIT (OUTPATIENT)
Dept: ORTHOPEDIC SURGERY | Facility: CLINIC | Age: 78
End: 2023-09-28
Payer: MEDICARE

## 2023-09-28 VITALS — HEIGHT: 67 IN | BODY MASS INDEX: 24.96 KG/M2 | WEIGHT: 159 LBS

## 2023-09-28 DIAGNOSIS — M17.11 OSTEOARTHRITIS OF RIGHT KNEE, UNSPECIFIED OSTEOARTHRITIS TYPE: ICD-10-CM

## 2023-09-28 DIAGNOSIS — M25.561 RIGHT KNEE PAIN, UNSPECIFIED CHRONICITY: Primary | ICD-10-CM

## 2023-09-28 NOTE — PROGRESS NOTES
"Chief Complaint  Initial Evaluation of the Right Knee     Subjective      Elizabeth Dwyer presents to [facility] for initial evaluation of the right knee She has had pain in the knee for years. She states the pain has gotten worse the last few months.  She has been bracing off and on.  She had been seeing Dr Menezes and has had injections in the past.  She states her knee aches and has a bakers cyst and has thigh ache and tightness.  She has had no recent injury or fall.  Her last injection was last year around August.  She has pain with prolonged ambulation, stairs and prolonged standing.  She wears a brace all the time and uses cannabis cream. She is type 2 diabetic.     No Known Allergies     Social History     Socioeconomic History    Marital status:    Tobacco Use    Smoking status: Former    Smokeless tobacco: Never    Tobacco comments:     35/55-1ppd x 15 years / quit smoking approx 2003   Vaping Use    Vaping Use: Never used   Substance and Sexual Activity    Alcohol use: Never    Drug use: Never    Sexual activity: Defer        I reviewed the patient's chief complaint, history of present illness, review of systems, past medical history, surgical history, family history, social history, medications, and allergy list.     Review of Systems     Constitutional: Denies fevers, chills, weight loss  Cardiovascular: Denies chest pain, shortness of breath  Skin: Denies rashes, acute skin changes  Neurologic: Denies headache, loss of consciousness        Vital Signs:   Ht 170.2 cm (67\")   Wt 72.1 kg (159 lb)   BMI 24.90 kg/m²          Physical Exam  General: Alert. No acute distress    Ortho Exam        RIGHT KNEE Flexion 110. Extension -5. Stable to varus/valgus stress. Stable to anterior/posterior drawer. Neurovascularly intact. Negative Kylee. Negative Lachman. Positive EHL, FHL, HS and TA. Sensation intact to light touch all 5 nerves of the foot. Ambulates with Antalgic " gait. Patella is well tracking. Calf supple, non-tender. Positive tenderness to the medial joint line. Positive tenderness to the lateral joint line. Positive Crepitus. Good strength to hamstrings, quadriceps, dorsiflexors, and plantar flexors.  Knee Extensor Mechanism intact       Large Joint Arthrocentesis: R knee  Date/Time: 9/28/2023 10:39 AM  Consent given by: patient  Site marked: site marked  Timeout: Immediately prior to procedure a time out was called to verify the correct patient, procedure, equipment, support staff and site/side marked as required   Supporting Documentation  Indications: pain   Procedure Details  Location: knee - R knee  Needle gauge: 21g.  Medications administered: 32 mg Triamcinolone Acetonide 32 MG  Patient tolerance: patient tolerated the procedure well with no immediate complications        Imaging Results (Most Recent)       Procedure Component Value Units Date/Time    XR Knee 3 View Right [[id number]] Resulted: 09/28/23 0935     Updated: 09/28/23 0939             Result Review :     X-Ray Report:  Right knee X-Ray  Indication: Evaluation of the right knee  AP/Lateral and [place] view(s)  Findings: Advanced degenerative arthritis with bone on bone.    Prior studies available for comparison: yes           Assessment and Plan     Diagnoses and all orders for this visit:    1. Right knee pain, unspecified chronicity (Primary)  -     XR Knee 3 View Right    2. Osteoarthritis of right knee, unspecified osteoarthritis type        Discussed the treatment plan with the patient. I reviewed the X-rays that were obtained today with the patient.     Discussed the treatment options with the patient, operative vs non-operative. The patient is a candidate of a right total knee arthroplasty when she is ready.     The patient expressed understanding and wished to proceed with a right knee Zilretta injection.  She tolerated the injection well.     Call or return if worsening symptoms.    Follow Up      PRN      Patient was given instructions and counseling regarding her condition or for health maintenance advice. Please see specific information pulled into the AVS if appropriate.     Scribed for Sophia Miles MD by Alisa Garces MA.  09/28/23   09:53 EDT    I have personally performed the services described in this document as scribed by the above individual and it is both accurate and complete. Sophia Miles MD 09/28/23

## 2023-11-10 ENCOUNTER — TRANSCRIBE ORDERS (OUTPATIENT)
Dept: ADMINISTRATIVE | Facility: HOSPITAL | Age: 78
End: 2023-11-10
Payer: MEDICARE

## 2023-11-10 DIAGNOSIS — Z12.31 ENCOUNTER FOR SCREENING MAMMOGRAM FOR BREAST CANCER: Primary | ICD-10-CM

## 2023-12-26 ENCOUNTER — OFFICE VISIT (OUTPATIENT)
Dept: ORTHOPEDIC SURGERY | Facility: CLINIC | Age: 78
End: 2023-12-26
Payer: MEDICARE

## 2023-12-26 VITALS
WEIGHT: 159 LBS | SYSTOLIC BLOOD PRESSURE: 145 MMHG | HEIGHT: 67 IN | OXYGEN SATURATION: 96 % | BODY MASS INDEX: 24.96 KG/M2 | DIASTOLIC BLOOD PRESSURE: 80 MMHG | HEART RATE: 87 BPM

## 2023-12-26 DIAGNOSIS — M25.561 RIGHT KNEE PAIN, UNSPECIFIED CHRONICITY: Primary | ICD-10-CM

## 2023-12-26 DIAGNOSIS — M17.11 OSTEOARTHRITIS OF RIGHT KNEE, UNSPECIFIED OSTEOARTHRITIS TYPE: ICD-10-CM

## 2023-12-26 NOTE — PROGRESS NOTES
"Chief Complaint  Follow-up of the Right Knee     Subjective      Elizabeth Dwyer presents to [facility] for follow up of the right knee. She has had pain for awhile.  She notes she has had injections, anti inflammatories and exercises in the past.  She had a Zilretta injection 9/28/23 that gave some relief.  Last Thursday the pain got really bad and was having pain with weight bearing.  She has a red area mid shin.  She notes she is now tender to palpation that has not happened before.  She has pain in and around the patella. She has pain on the medial aspect of the knee.  She has pain with transition from sit to stand and weight bearing the last week.  She has been icing and using Tylenol. Her last X rays were 9/28/23.     No Known Allergies     Social History     Socioeconomic History    Marital status:    Tobacco Use    Smoking status: Former    Smokeless tobacco: Never    Tobacco comments:     35/55-1ppd x 15 years / quit smoking approx 2003   Vaping Use    Vaping Use: Never used   Substance and Sexual Activity    Alcohol use: Never    Drug use: Never    Sexual activity: Defer        I reviewed the patient's chief complaint, history of present illness, review of systems, past medical history, surgical history, family history, social history, medications, and allergy list.     Review of Systems     Constitutional: Denies fevers, chills, weight loss  Cardiovascular: Denies chest pain, shortness of breath  Skin: Denies rashes, acute skin changes  Neurologic: Denies headache, loss of consciousness        Vital Signs:   /80 (BP Location: Left arm, Patient Position: Sitting, Cuff Size: Adult)   Pulse 87   Ht 170.2 cm (67\")   Wt 72.1 kg (159 lb)   SpO2 96%   BMI 24.90 kg/m²          Physical Exam  General: Alert. No acute distress    Ortho Exam        RIGHT KNEE Flexion 110. Extension -5. Stable to varus/valgus stress. Stable to anterior/posterior drawer. " Neurovascularly intact. Negative Kylee. Negative Lachman. Positive EHL, FHL, HS and TA. Sensation intact to light touch all 5 nerves of the foot. Ambulates with Antalgic gait. Patella is well tracking. Calf supple, non-tender. Positive tenderness to the medial joint line. Positive tenderness to the lateral joint line. Positive Crepitus. Good strength to hamstrings, quadriceps, dorsiflexors, and plantar flexors.  Knee Extensor Mechanism intact       Procedures      Imaging Results (Most Recent)       None             Result Review :             Assessment and Plan     Diagnoses and all orders for this visit:    1. Right knee pain, unspecified chronicity (Primary)    2. Osteoarthritis of right knee, unspecified osteoarthritis type        Discussed the treatment plan with the patient.        Prescribed topical cream.  HEP exercises.         Call or return if worsening symptoms.    Follow Up     1-2 weeks with Zilretta injection of the right knee.       Patient was given instructions and counseling regarding her condition or for health maintenance advice. Please see specific information pulled into the AVS if appropriate.     Scribed for Sophia Miles MD by Alisa Garces MA.  12/26/23   14:52 EST    I have personally performed the services described in this document as scribed by the above individual and it is both accurate and complete. Sophia Miles MD 12/26/23

## 2024-01-12 ENCOUNTER — OFFICE VISIT (OUTPATIENT)
Dept: ORTHOPEDIC SURGERY | Facility: CLINIC | Age: 79
End: 2024-01-12
Payer: MEDICARE

## 2024-01-12 VITALS
OXYGEN SATURATION: 93 % | WEIGHT: 159 LBS | DIASTOLIC BLOOD PRESSURE: 63 MMHG | HEART RATE: 62 BPM | SYSTOLIC BLOOD PRESSURE: 147 MMHG | HEIGHT: 67 IN | BODY MASS INDEX: 24.96 KG/M2

## 2024-01-12 DIAGNOSIS — M17.11 OSTEOARTHRITIS OF RIGHT KNEE, UNSPECIFIED OSTEOARTHRITIS TYPE: Primary | ICD-10-CM

## 2024-01-12 RX ORDER — LIDOCAINE HYDROCHLORIDE 10 MG/ML
5 INJECTION, SOLUTION INFILTRATION; PERINEURAL
Status: COMPLETED | OUTPATIENT
Start: 2024-01-12 | End: 2024-01-12

## 2024-01-12 RX ORDER — MELOXICAM 7.5 MG/1
7.5 TABLET ORAL DAILY
Qty: 30 TABLET | Refills: 1 | Status: SHIPPED | OUTPATIENT
Start: 2024-01-12

## 2024-01-12 RX ADMIN — LIDOCAINE HYDROCHLORIDE 5 ML: 10 INJECTION, SOLUTION INFILTRATION; PERINEURAL at 09:49

## 2024-01-12 NOTE — PROGRESS NOTES
"Chief Complaint  Follow-up of the Right Knee     Subjective      Elizabeth Dwyer presents to [facility] for a follow up for her right knee. She had a right knee Zilretta injection done on her right knee back on 09/28/23. She reports this gave her some relief and is requesting a repeat today. She reports no new injury, trauma or falls since her last visit.     No Known Allergies     Social History     Socioeconomic History    Marital status:    Tobacco Use    Smoking status: Former    Smokeless tobacco: Never    Tobacco comments:     35/55-1ppd x 15 years / quit smoking approx 2003   Vaping Use    Vaping Use: Never used   Substance and Sexual Activity    Alcohol use: Never    Drug use: Never    Sexual activity: Defer        I reviewed the patient's chief complaint, history of present illness, review of systems, past medical history, surgical history, family history, social history, medications, and allergy list.     Review of Systems     Constitutional: Denies fevers, chills, weight loss  Cardiovascular: Denies chest pain, shortness of breath  Skin: Denies rashes, acute skin changes  Neurologic: Denies headache, loss of consciousness  MSK: right knee pain       Vital Signs:   /63   Pulse 62   Ht 170.2 cm (67\")   Wt 72.1 kg (159 lb)   SpO2 93%   BMI 24.90 kg/m²          Physical Exam  General: Alert. No acute distress    Ortho Exam      Right lower extremity: Flexion 110. Extension -5. Stable to varus/valgus stress. Stable to anterior/posterior drawer. Neurovascularly intact. Negative Kylee. Negative Lachman. Positive EHL, FHL, HS and TA. Sensation intact to light touch all 5 nerves of the foot. Ambulates with Antalgic gait. Patella is well tracking. Calf supple, non-tender. Positive tenderness to the medial joint line. Positive tenderness to the lateral joint line. Positive Crepitus. Good strength to hamstrings, quadriceps, dorsiflexors, and plantar flexors.  " Knee Extensor Mechanism intact        Large Joint RIGHT KNEE: R knee  Date/Time: 1/12/2024 9:49 AM  Consent given by: patient  Site marked: site marked  Timeout: Immediately prior to procedure a time out was called to verify the correct patient, procedure, equipment, support staff and site/side marked as required   Supporting Documentation  Indications: pain   Procedure Details  Location: knee - R knee  Preparation: Patient was prepped and draped in the usual sterile fashion  Needle gauge: 21G.  Medications administered: 5 mL lidocaine 1 %; 32 mg Triamcinolone Acetonide 32 MG  Patient tolerance: patient tolerated the procedure well with no immediate complications          Imaging Results (Most Recent)       None             Result Review :       No results found.           Assessment and Plan     Diagnoses and all orders for this visit:    1. Osteoarthritis of right knee, unspecified osteoarthritis type (Primary)        The patient presents here today for a follow up for her right knee.     Discussed the risks and benefits of a right knee zilretta injection today in the office. Patient expressed understanding and wishes to proceed. She tolerated the injection well.       Call or return if worsening symptoms.    Follow Up     PRN       Patient was given instructions and counseling regarding her condition or for health maintenance advice. Please see specific information pulled into the AVS if appropriate.     Scribed for Sophia Miles MD by Mile Zamudio.  01/12/24   09:28 EST      I have personally performed the services described in this document as scribed by the above individual and it is both accurate and complete. Sophia Miles MD 01/12/24

## 2024-04-18 ENCOUNTER — OFFICE VISIT (OUTPATIENT)
Dept: ORTHOPEDIC SURGERY | Facility: CLINIC | Age: 79
End: 2024-04-18
Payer: MEDICARE

## 2024-04-18 VITALS
DIASTOLIC BLOOD PRESSURE: 70 MMHG | OXYGEN SATURATION: 93 % | HEART RATE: 71 BPM | WEIGHT: 162 LBS | HEIGHT: 67 IN | BODY MASS INDEX: 25.43 KG/M2 | SYSTOLIC BLOOD PRESSURE: 130 MMHG

## 2024-04-18 DIAGNOSIS — M25.561 RIGHT KNEE PAIN, UNSPECIFIED CHRONICITY: ICD-10-CM

## 2024-04-18 DIAGNOSIS — M17.11 OSTEOARTHRITIS OF RIGHT KNEE, UNSPECIFIED OSTEOARTHRITIS TYPE: Primary | ICD-10-CM

## 2024-04-18 RX ORDER — LIDOCAINE HYDROCHLORIDE 10 MG/ML
5 INJECTION, SOLUTION INFILTRATION; PERINEURAL
Status: COMPLETED | OUTPATIENT
Start: 2024-04-18 | End: 2024-04-18

## 2024-04-18 RX ADMIN — LIDOCAINE HYDROCHLORIDE 5 ML: 10 INJECTION, SOLUTION INFILTRATION; PERINEURAL at 11:30

## 2024-04-18 NOTE — PROGRESS NOTES
"Chief Complaint  Pain and Follow-up of the Right Knee     Subjective      Elizabeth Dwyer presents to [facility] for follow up of the right knee. She had a right knee Zilretta injection done on her right knee back on 01/12/24. She reports this gave her some relief and is requesting a repeat today. She reports no new injury, trauma or falls since her last visit.     No Known Allergies     Social History     Socioeconomic History    Marital status:    Tobacco Use    Smoking status: Former    Smokeless tobacco: Never    Tobacco comments:     35/55-1ppd x 15 years / quit smoking approx 2003   Vaping Use    Vaping status: Never Used   Substance and Sexual Activity    Alcohol use: Never    Drug use: Never    Sexual activity: Defer        I reviewed the patient's chief complaint, history of present illness, review of systems, past medical history, surgical history, family history, social history, medications, and allergy list.     Review of Systems     Constitutional: Denies fevers, chills, weight loss  Cardiovascular: Denies chest pain, shortness of breath  Skin: Denies rashes, acute skin changes  Neurologic: Denies headache, loss of consciousness      Vital Signs:   /70   Pulse 71   Ht 170.2 cm (67\")   Wt 73.5 kg (162 lb)   SpO2 93%   BMI 25.37 kg/m²          Physical Exam  General: Alert. No acute distress    Ortho Exam       Right knee  Flexion 110. Extension -5. Stable to varus/valgus stress. Stable to anterior/posterior drawer. Neurovascularly intact. Positive EHL, FHL, HS and TA. Sensation intact to light touch all 5 nerves of the foot. Ambulates with Antalgic gait. Patella is well tracking. Calf supple, non-tender.Good strength to hamstrings, quadriceps, dorsiflexors, and plantar flexors.  Knee Extensor Mechanism intact        Large Joint: R knee  Date/Time: 4/18/2024 11:30 AM  Consent given by: patient  Site marked: site marked  Timeout: Immediately prior to " procedure a time out was called to verify the correct patient, procedure, equipment, support staff and site/side marked as required   Supporting Documentation  Indications: pain   Procedure Details  Location: knee - R knee  Preparation: Patient was prepped and draped in the usual sterile fashion  Needle gauge: 21G.  Medications administered: 5 mL lidocaine 1 %; 32 mg Triamcinolone Acetonide 32 MG  Patient tolerance: patient tolerated the procedure well with no immediate complications    This injection documentation was Scribed for Sophia Miles MD by Sheyla Shaffer MA.  04/18/24   11:30 EDT       Imaging Results (Most Recent)       None             Result Review :             Assessment and Plan     Diagnoses and all orders for this visit:    1. Osteoarthritis of right knee, unspecified osteoarthritis type (Primary)    2. Right knee pain, unspecified chronicity        The patient presents here today for a follow up for her right knee.      Discussed the risks and benefits of a right knee zilretta injection today in the office. Patient expressed understanding and wishes to proceed. She tolerated the injection well.       Call or return if worsening symptoms.    Follow Up     PRN      Patient was given instructions and counseling regarding her condition or for health maintenance advice. Please see specific information pulled into the AVS if appropriate.     Scribed for Sophia Miles MD by Alisa Garces MA.  04/18/24   10:59 EDT    I have personally performed the services described in this document as scribed by the above individual and it is both accurate and complete. Sophia Miles MD 04/18/24

## 2024-08-22 ENCOUNTER — OFFICE VISIT (OUTPATIENT)
Dept: ORTHOPEDIC SURGERY | Facility: CLINIC | Age: 79
End: 2024-08-22
Payer: MEDICARE

## 2024-08-22 VITALS
SYSTOLIC BLOOD PRESSURE: 152 MMHG | BODY MASS INDEX: 24.8 KG/M2 | OXYGEN SATURATION: 96 % | HEART RATE: 66 BPM | WEIGHT: 158 LBS | DIASTOLIC BLOOD PRESSURE: 80 MMHG | HEIGHT: 67 IN

## 2024-08-22 DIAGNOSIS — M17.11 OSTEOARTHRITIS OF RIGHT KNEE, UNSPECIFIED OSTEOARTHRITIS TYPE: Primary | ICD-10-CM

## 2024-08-22 RX ADMIN — LIDOCAINE HYDROCHLORIDE 5 ML: 10 INJECTION, SOLUTION INFILTRATION; PERINEURAL at 11:11

## 2024-08-22 NOTE — PROGRESS NOTES
"Chief Complaint  Follow-up and Pain of the Right Knee     Subjective      Elizabeth Dwyer presents to [facility] for a follow up for her right knee. She has been treating her right knee osteoarthritis conservatively with injections. She was last seen in the office on 04/18/24 where she had a right knee Zilretta injection.She reports this injection gave her relief and has recently worn off. She is requesting a repeat injection today in the office.      No Known Allergies     Social History     Socioeconomic History    Marital status:    Tobacco Use    Smoking status: Former    Smokeless tobacco: Never    Tobacco comments:     35/55-1ppd x 15 years / quit smoking approx 2003   Vaping Use    Vaping status: Never Used   Substance and Sexual Activity    Alcohol use: Never    Drug use: Never    Sexual activity: Defer        I reviewed the patient's chief complaint, history of present illness, review of systems, past medical history, surgical history, family history, social history, medications, and allergy list.     Review of Systems     Constitutional: Denies fevers, chills, weight loss  Cardiovascular: Denies chest pain, shortness of breath  Skin: Denies rashes, acute skin changes  Neurologic: Denies headache, loss of consciousness  MSK: Right knee pain       Vital Signs:   /80   Pulse 66   Ht 170.2 cm (67\")   Wt 71.7 kg (158 lb)   SpO2 96%   BMI 24.75 kg/m²          Physical Exam  General: Alert. No acute distress    Ortho Exam        Right lower extremity: Flexion 110. Extension -5. Stable to varus/valgus stress. Stable to anterior/posterior drawer. Neurovascularly intact. Positive EHL, FHL, HS and TA. Sensation intact to light touch all 5 nerves of the foot. Ambulates with Antalgic gait. Patella is well tracking. Calf supple, non-tender.Good strength to hamstrings, quadriceps, dorsiflexors, and plantar flexors.  Knee Extensor Mechanism intact        Large Joint: R " knee  Date/Time: 8/22/2024 11:11 AM  Consent given by: patient  Site marked: site marked  Timeout: Immediately prior to procedure a time out was called to verify the correct patient, procedure, equipment, support staff and site/side marked as required   Supporting Documentation  Indications: pain   Procedure Details  Location: knee - R knee  Preparation: Patient was prepped and draped in the usual sterile fashion  Needle gauge: 21 G.  Medications administered: 5 mL lidocaine 1 %; 32 mg Triamcinolone Acetonide 32 MG  Patient tolerance: patient tolerated the procedure well with no immediate complications      This injection documentation was Scribed for Sophia Miles MD by Sheyla Shaffer MA.  08/22/24   11:11 EDT      Imaging Results (Most Recent)       None             Result Review :       No results found.           Assessment and Plan     Diagnoses and all orders for this visit:    1. Osteoarthritis of right knee, unspecified osteoarthritis type (Primary)    Other orders  -     Large Joint: R knee        Discussed the risks and benefits of a right knee Zilretta injection today in the office. Patient expressed understanding and wishes to proceed. She tolerated the injection well and without any complications.     Home exercises given today.     Call or return if worsening symptoms.    Follow Up     PRN       Patient was given instructions and counseling regarding her condition or for health maintenance advice. Please see specific information pulled into the AVS if appropriate.     TransScribed for Sophia Miles MD by Mile Zamudio CMA   08/22/24   11:11 EDT    I have personally performed the services described in this document as scribed by the above individual and it is both accurate and complete. Sophia Miles MD 08/23/24

## 2024-08-23 RX ORDER — LIDOCAINE HYDROCHLORIDE 10 MG/ML
5 INJECTION, SOLUTION INFILTRATION; PERINEURAL
Status: COMPLETED | OUTPATIENT
Start: 2024-08-22 | End: 2024-08-22

## 2025-01-30 ENCOUNTER — OFFICE VISIT (OUTPATIENT)
Dept: ORTHOPEDIC SURGERY | Facility: CLINIC | Age: 80
End: 2025-01-30
Payer: MEDICARE

## 2025-01-30 VITALS
HEIGHT: 67 IN | WEIGHT: 157.6 LBS | OXYGEN SATURATION: 95 % | SYSTOLIC BLOOD PRESSURE: 138 MMHG | DIASTOLIC BLOOD PRESSURE: 63 MMHG | HEART RATE: 71 BPM | BODY MASS INDEX: 24.74 KG/M2

## 2025-01-30 DIAGNOSIS — M79.671 RIGHT FOOT PAIN: ICD-10-CM

## 2025-01-30 DIAGNOSIS — M17.11 OSTEOARTHRITIS OF RIGHT KNEE, UNSPECIFIED OSTEOARTHRITIS TYPE: Primary | ICD-10-CM

## 2025-01-30 DIAGNOSIS — M25.561 RIGHT KNEE PAIN, UNSPECIFIED CHRONICITY: ICD-10-CM

## 2025-01-30 RX ORDER — MELOXICAM 7.5 MG/1
7.5 TABLET ORAL DAILY
Qty: 90 TABLET | Refills: 1 | Status: SHIPPED | OUTPATIENT
Start: 2025-01-30

## 2025-01-30 NOTE — PROGRESS NOTES
"Chief Complaint  Follow-up of the Right Knee and Initial Evaluation of the Right Foot     Subjective      Elizabeth Dwyer presents to [facility] for follow up of the right knee initial evaluation of the right foot.   She ambulates with a cane for support.  She has treated her osteo arthritis conservatively with injections.  Her last Zilretta injection was 8/22/4.  She notes the injections work less and less.  Sometimes she can't ambulate and weight bear.  She has pain on the medial aspect of the knee.  She has pain that goes up into her right thigh and now her left knee is starting to hurt and her left foot is causing a lot of pain.  She notes she has not had recent injury or fall.  She has failed conservative measures.  She has tried topical creams.  Sometimes she feels her toes are going to sleep.  Sometimes she wears toe socks and it helps.  She has pain on the top of the foot and along the 1 st metatarsal     No Known Allergies     Social History     Socioeconomic History    Marital status:    Tobacco Use    Smoking status: Former    Smokeless tobacco: Never    Tobacco comments:     35/55-1ppd x 15 years / quit smoking approx 2003   Vaping Use    Vaping status: Never Used   Substance and Sexual Activity    Alcohol use: Never    Drug use: Never    Sexual activity: Defer        I reviewed the patient's chief complaint, history of present illness, review of systems, past medical history, surgical history, family history, social history, medications, and allergy list.     Review of Systems     Constitutional: Denies fevers, chills, weight loss  Cardiovascular: Denies chest pain, shortness of breath  Skin: Denies rashes, acute skin changes  Neurologic: Denies headache, loss of consciousness        Vital Signs:   /63   Pulse 71   Ht 170.2 cm (67\")   Wt 71.5 kg (157 lb 9.6 oz)   SpO2 95%   BMI 24.68 kg/m²          Physical Exam  General: Alert. No acute " distress    Ortho Exam        RIGHT KNEE Flexion 95. Extension -3. Stable to varus/valgus stress. Stable to anterior/posterior drawer. Neurovascularly intact. Negative Kylee. Negative Lachman. Positive EHL, FHL, HS and TA. Sensation intact to light touch all 5 nerves of the foot. Ambulates with Antalgic gait. Patella is well tracking. Calf supple, non-tender. Positive tenderness to the medial joint line. Negative tenderness to the lateral joint line. Positive Crepitus. Good strength to hamstrings, quadriceps, dorsiflexors, and plantar flexors.  Knee Extensor Mechanism intact Mild swelling.   Corenll Kellgren grading scale is a 4.      RIGHT FOOT Positive EHL, FHL, GS and TA. Sensation intact to all 5 nerves of the foot. Positive pulses. Neurovascularly intact. Calf soft, Non-tender.Mildly full ROM of the ankle.  Stable to stress. Tender to the top of the foot and along the first metatarsal. . Intact flexion and extension of toes.        Procedures      Imaging Results (Most Recent)       Procedure Component Value Units Date/Time    XR Knee 3 View Right [[id number]] Resulted: 01/30/25 0930     Updated: 01/30/25 0935             Result Review :     X-Ray Report:  Right knee X-Ray  Indication: Evaluation of the right knee  AP/Lateral and [place] view(s)  Findings: Advanced degenerative end stage bone on bone arthritis.    Prior studies available for comparison: yes        Assessment and Plan     Diagnoses and all orders for this visit:    1. Osteoarthritis of right knee, unspecified osteoarthritis type (Primary)  -     XR Knee 3 View Right    2. Right knee pain, unspecified chronicity    3. Right foot pain        Discussed the treatment plan with the patient. I reviewed the X-rays that were obtained today with the patient.     Discussed the treatment options with the patient, operative vs non-operative. The patient is a candidate for a right total knee arthroplasty whenever she is ready.      She would like to try a  brace for the right knee.  Discussed continuing meloxicam and HEP exercises given for the right foot.     Call or return if worsening symptoms.    Follow Up     PRN      Patient was given instructions and counseling regarding her condition or for health maintenance advice. Please see specific information pulled into the AVS if appropriate.     Scribed for Sophia Miles MD by Alisa Garces MA.  01/30/25   09:26 EST    I have personally performed the services described in this document as scribed by the above individual and it is both accurate and complete. Sophia Miles MD 01/30/25

## 2025-02-10 ENCOUNTER — TELEPHONE (OUTPATIENT)
Dept: ORTHOPEDIC SURGERY | Facility: CLINIC | Age: 80
End: 2025-02-10

## 2025-02-10 NOTE — TELEPHONE ENCOUNTER
Caller: Elizabeth Dwyer    Relationship to patient: Self    Best call back number: [phone]    Chief complaint: RIGHT KNEE    Type of visit: PRP INJECTION    Requested date: ASAP

## 2025-02-20 ENCOUNTER — OFFICE VISIT (OUTPATIENT)
Dept: ORTHOPEDIC SURGERY | Facility: CLINIC | Age: 80
End: 2025-02-20
Payer: MEDICARE

## 2025-02-20 VITALS
HEART RATE: 76 BPM | OXYGEN SATURATION: 96 % | BODY MASS INDEX: 24.64 KG/M2 | WEIGHT: 157 LBS | SYSTOLIC BLOOD PRESSURE: 124 MMHG | DIASTOLIC BLOOD PRESSURE: 71 MMHG | HEIGHT: 67 IN

## 2025-02-20 DIAGNOSIS — M25.561 RIGHT KNEE PAIN, UNSPECIFIED CHRONICITY: ICD-10-CM

## 2025-02-20 DIAGNOSIS — M17.11 OSTEOARTHRITIS OF RIGHT KNEE, UNSPECIFIED OSTEOARTHRITIS TYPE: Primary | ICD-10-CM

## 2025-02-20 NOTE — PROGRESS NOTES
"Chief Complaint  Follow-up of the Right Knee     Subjective      Elizabeth Dwyer presents to [facility] for follow up of the right knee.  She ambulates with a cane for support and stability.  She has had injections that are not giving any relief.  She wants to discuss acupuncture and what the thoughts of Dr Miles are on that topic.  Sometimes she can't ambulate and weight bear. She has pain on the medial aspect of the knee. She has pain that goes up into her right thigh and now her left knee is starting to hurt and her left foot is causing a lot of pain. She notes she has not had recent injury or fall. She has failed conservative measures. She has tried topical creams. Discussed PRP injections and no injections are paid from by insurance. She is having pain in the left foot.      No Known Allergies     Social History     Socioeconomic History    Marital status:    Tobacco Use    Smoking status: Former    Smokeless tobacco: Never    Tobacco comments:     35/55-1ppd x 15 years / quit smoking approx 2003   Vaping Use    Vaping status: Never Used   Substance and Sexual Activity    Alcohol use: Never    Drug use: Never    Sexual activity: Defer        I reviewed the patient's chief complaint, history of present illness, review of systems, past medical history, surgical history, family history, social history, medications, and allergy list.     Review of Systems     Constitutional: Denies fevers, chills, weight loss  Cardiovascular: Denies chest pain, shortness of breath  Skin: Denies rashes, acute skin changes  Neurologic: Denies headache, loss of consciousness        Vital Signs:   /71   Pulse 76   Ht 170.2 cm (67\")   Wt 71.2 kg (157 lb)   SpO2 96%   BMI 24.59 kg/m²          Physical Exam  General: Alert. No acute distress    Ortho Exam        RIGHT KNEE Flexion 95. Extension -3. Stable to varus/valgus stress. Stable to anterior/posterior drawer. Neurovascularly " intact. Negative Kylee. Negative Lachman. Positive EHL, FHL, HS and TA. Sensation intact to light touch all 5 nerves of the foot. Ambulates with Antalgic gait. Patella is well tracking. Calf supple, non-tender. Positive tenderness to the medial joint line. Negative tenderness to the lateral joint line. Positive Crepitus. Good strength to hamstrings, quadriceps, dorsiflexors, and plantar flexors.  Knee Extensor Mechanism intact Mild swelling.   Cornell Kellgren grading scale is a 4.       Procedures      Imaging Results (Most Recent)       None             Result Review :         XR Knee 3 View Right    Result Date: 1/30/2025  Narrative: X-Ray Report: Right knee X-Ray Indication: Evaluation of the right knee AP/Lateral and [place] view(s) Findings: Advanced degenerative end stage bone on bone arthritis.  Prior studies available for comparison: yes              Assessment and Plan     Diagnoses and all orders for this visit:    1. Osteoarthritis of right knee, unspecified osteoarthritis type (Primary)    2. Right knee pain, unspecified chronicity        Discussed the treatment plan with the patient. I reviewed the X-rays that were obtained 1/30/25 with the patient.     Discussed acupuncture and PRP injections.  PRP injections are paid out of pocket and not covered by insurance and for more moderate arthritis.  There is one person in town that does acupuncture and I am fine if she wants possibly  try that first prior to a total knee arthroplasty.     Discussed the treatment options with the patient, operative vs non-operative. The patient is a candidate for a right total knee arthroplasty whenever she is ready.     Discussed rheumatologist and wanted to know if that would be helpful.      Prescribed rheumatologist referral.      Call or return if worsening symptoms.    Follow Up     PRN      Patient was given instructions and counseling regarding her condition or for health maintenance advice. Please see specific  information pulled into the AVS if appropriate.     Scribed for Sophia Miles MD by Alisa Garces MA.  02/20/25   13:01 EST    I have personally performed the services described in this document as scribed by the above individual and it is both accurate and complete. Sophia Miles MD 02/20/25